# Patient Record
Sex: FEMALE | Race: WHITE | NOT HISPANIC OR LATINO | Employment: FULL TIME | URBAN - METROPOLITAN AREA
[De-identification: names, ages, dates, MRNs, and addresses within clinical notes are randomized per-mention and may not be internally consistent; named-entity substitution may affect disease eponyms.]

---

## 2019-01-18 PROCEDURE — 88175 CYTOPATH C/V AUTO FLUID REDO: CPT | Performed by: OBSTETRICS & GYNECOLOGY

## 2019-01-18 PROCEDURE — 87624 HPV HI-RISK TYP POOLED RSLT: CPT | Performed by: OBSTETRICS & GYNECOLOGY

## 2019-05-14 ENCOUNTER — WALK IN (OUTPATIENT)
Dept: URGENT CARE | Age: 34
End: 2019-05-14

## 2019-05-14 VITALS
DIASTOLIC BLOOD PRESSURE: 80 MMHG | WEIGHT: 150 LBS | HEIGHT: 65 IN | OXYGEN SATURATION: 100 % | HEART RATE: 87 BPM | TEMPERATURE: 98.7 F | RESPIRATION RATE: 18 BRPM | SYSTOLIC BLOOD PRESSURE: 102 MMHG | BODY MASS INDEX: 24.99 KG/M2

## 2019-05-14 DIAGNOSIS — J00 ACUTE NASOPHARYNGITIS: Primary | ICD-10-CM

## 2019-05-14 DIAGNOSIS — J02.9 SORE THROAT: ICD-10-CM

## 2019-05-14 LAB
INTERNAL PROCEDURAL CONTROLS ACCEPTABLE: YES
S PYO AG THROAT QL IA.RAPID: NEGATIVE

## 2019-05-14 PROCEDURE — 99203 OFFICE O/P NEW LOW 30 MIN: CPT | Performed by: NURSE PRACTITIONER

## 2019-05-14 PROCEDURE — 87880 STREP A ASSAY W/OPTIC: CPT | Performed by: NURSE PRACTITIONER

## 2019-05-14 RX ORDER — FEXOFENADINE HCL 180 MG/1
180 TABLET ORAL DAILY
COMMUNITY

## 2019-05-14 RX ORDER — LORAZEPAM 0.5 MG/1
TABLET ORAL
Refills: 2 | COMMUNITY
Start: 2019-04-17 | End: 2023-03-21 | Stop reason: DRUGHIGH

## 2019-05-14 RX ORDER — SERTRALINE HYDROCHLORIDE 25 MG/1
25 TABLET, FILM COATED ORAL DAILY
COMMUNITY
End: 2022-05-02 | Stop reason: ALTCHOICE

## 2019-05-14 SDOH — HEALTH STABILITY: MENTAL HEALTH: HOW OFTEN DO YOU HAVE A DRINK CONTAINING ALCOHOL?: NEVER

## 2019-05-14 ASSESSMENT — ENCOUNTER SYMPTOMS
SWOLLEN GLANDS: 0
TROUBLE SWALLOWING: 0
RESPIRATORY NEGATIVE: 1
HEADACHES: 0
RHINORRHEA: 1
COUGH: 0
SORE THROAT: 1
CHILLS: 0
FATIGUE: 1
WHEEZING: 0
ALLERGIC/IMMUNOLOGIC NEGATIVE: 1
SHORTNESS OF BREATH: 0
FEVER: 0
EYES NEGATIVE: 1

## 2020-04-06 PROBLEM — S52.125D CLOSED NONDISPLACED FRACTURE OF HEAD OF LEFT RADIUS WITH ROUTINE HEALING: Status: ACTIVE | Noted: 2020-04-06

## 2020-09-30 PROBLEM — S52.125D CLOSED NONDISPLACED FRACTURE OF HEAD OF LEFT RADIUS WITH ROUTINE HEALING: Status: RESOLVED | Noted: 2020-04-06 | Resolved: 2020-09-30

## 2020-10-02 ENCOUNTER — EMPLOYEE HEALTH (OUTPATIENT)
Dept: OTHER | Age: 35
End: 2020-10-02

## 2020-10-02 DIAGNOSIS — Z20.822 SUSPECTED COVID-19 VIRUS INFECTION: Primary | ICD-10-CM

## 2020-10-04 ENCOUNTER — EMPLOYEE HEALTH (OUTPATIENT)
Dept: OTHER | Age: 35
End: 2020-10-04

## 2020-10-05 ENCOUNTER — EMPLOYEE HEALTH (OUTPATIENT)
Dept: OTHER | Age: 35
End: 2020-10-05

## 2020-10-06 ENCOUNTER — EMPLOYEE HEALTH (OUTPATIENT)
Dept: OTHER | Age: 35
End: 2020-10-06

## 2020-10-08 ENCOUNTER — EMPLOYEE HEALTH (OUTPATIENT)
Dept: OTHER | Age: 35
End: 2020-10-08

## 2021-03-23 ENCOUNTER — IMMUNIZATION (OUTPATIENT)
Dept: LAB | Age: 36
End: 2021-03-23

## 2021-03-23 DIAGNOSIS — Z23 NEED FOR VACCINATION: Primary | ICD-10-CM

## 2021-03-23 PROCEDURE — 91300 COVID 19 PFIZER-BIONTECH: CPT | Performed by: HOSPITALIST

## 2021-03-23 PROCEDURE — 0001A COVID 19 PFIZER-BIONTECH: CPT | Performed by: HOSPITALIST

## 2021-04-14 ENCOUNTER — IMMUNIZATION (OUTPATIENT)
Dept: LAB | Age: 36
End: 2021-04-14

## 2021-04-14 DIAGNOSIS — Z23 NEED FOR VACCINATION: Primary | ICD-10-CM

## 2021-04-14 PROCEDURE — 0002A COVID 19 PFIZER-BIONTECH: CPT | Performed by: NURSE PRACTITIONER

## 2021-04-14 PROCEDURE — 91300 COVID 19 PFIZER-BIONTECH: CPT | Performed by: NURSE PRACTITIONER

## 2021-07-22 PROBLEM — R73.03 PREDIABETES: Status: ACTIVE | Noted: 2021-07-22

## 2021-07-22 PROBLEM — E06.3 HASHIMOTO'S THYROIDITIS: Status: ACTIVE | Noted: 2021-07-22

## 2021-12-07 ENCOUNTER — LAB REQUISITION (OUTPATIENT)
Dept: LAB | Age: 36
End: 2021-12-07

## 2021-12-07 DIAGNOSIS — Z00.00 ENCOUNTER FOR GENERAL ADULT MEDICAL EXAMINATION WITHOUT ABNORMAL FINDINGS: ICD-10-CM

## 2021-12-07 PROCEDURE — PSEU9049 QUANTIFERON TB PLUS: Performed by: CLINICAL MEDICAL LABORATORY

## 2021-12-07 PROCEDURE — 86480 TB TEST CELL IMMUN MEASURE: CPT | Performed by: CLINICAL MEDICAL LABORATORY

## 2021-12-09 LAB
GAMMA INTERFERON BACKGROUND BLD IA-ACNC: 0.03 IU/ML
M TB IFN-G BLD-IMP: NEGATIVE
M TB IFN-G CD4+ BCKGRND COR BLD-ACNC: 0.02 IU/ML
M TB IFN-G CD4+CD8+ BCKGRND COR BLD-ACNC: 0.03 IU/ML
MITOGEN IGNF BCKGRD COR BLD-ACNC: >10 IU/ML

## 2022-01-16 ENCOUNTER — EMPLOYEE HEALTH (OUTPATIENT)
Dept: OTHER | Age: 37
End: 2022-01-16

## 2022-05-02 ENCOUNTER — WALK IN (OUTPATIENT)
Dept: URGENT CARE | Age: 37
End: 2022-05-02

## 2022-05-02 VITALS
TEMPERATURE: 98.4 F | WEIGHT: 179 LBS | SYSTOLIC BLOOD PRESSURE: 110 MMHG | DIASTOLIC BLOOD PRESSURE: 60 MMHG | HEART RATE: 80 BPM | RESPIRATION RATE: 16 BRPM | HEIGHT: 65 IN | BODY MASS INDEX: 29.82 KG/M2

## 2022-05-02 DIAGNOSIS — H10.32 ACUTE CONJUNCTIVITIS OF LEFT EYE, UNSPECIFIED ACUTE CONJUNCTIVITIS TYPE: Primary | ICD-10-CM

## 2022-05-02 PROCEDURE — 99213 OFFICE O/P EST LOW 20 MIN: CPT | Performed by: NURSE PRACTITIONER

## 2022-05-02 RX ORDER — TOBRAMYCIN 3 MG/ML
1 SOLUTION/ DROPS OPHTHALMIC EVERY 4 HOURS
Qty: 5 ML | Refills: 0 | Status: SHIPPED | OUTPATIENT
Start: 2022-05-02 | End: 2022-05-09

## 2022-05-02 ASSESSMENT — ENCOUNTER SYMPTOMS
RHINORRHEA: 0
ABDOMINAL PAIN: 0
POLYPHAGIA: 0
EYE DISCHARGE: 1
EYE ITCHING: 0
ADENOPATHY: 0
WHEEZING: 0
FOREIGN BODY SENSATION: 0
COLOR CHANGE: 0
CHILLS: 0
DIAPHORESIS: 0
SHORTNESS OF BREATH: 0
WEAKNESS: 0
FEVER: 0
COUGH: 0
FACIAL SWELLING: 0
TROUBLE SWALLOWING: 0
FATIGUE: 0
AGITATION: 0
CHEST TIGHTNESS: 0
BACK PAIN: 0
HEADACHES: 0
LIGHT-HEADEDNESS: 0
SLEEP DISTURBANCE: 0
POLYDIPSIA: 0
VOICE CHANGE: 0
BLURRED VISION: 0
DIARRHEA: 0
NUMBNESS: 0
CONSTIPATION: 0
EYE PAIN: 0
PHOTOPHOBIA: 0
SINUS PRESSURE: 0
CHOKING: 0
WOUND: 0
SINUS PAIN: 0
EYE REDNESS: 1
NAUSEA: 0
VOMITING: 0
DOUBLE VISION: 0
SORE THROAT: 0
APPETITE CHANGE: 0
DIZZINESS: 0

## 2022-06-22 ENCOUNTER — EMPLOYEE HEALTH (OUTPATIENT)
Dept: OTHER | Age: 37
End: 2022-06-22

## 2022-06-23 ENCOUNTER — EMPLOYEE HEALTH (OUTPATIENT)
Dept: OTHER | Age: 37
End: 2022-06-23

## 2022-06-28 ENCOUNTER — EMPLOYEE HEALTH (OUTPATIENT)
Dept: OTHER | Age: 37
End: 2022-06-28

## 2022-06-28 ENCOUNTER — HOSPITAL ENCOUNTER (OUTPATIENT)
Dept: GENERAL RADIOLOGY | Age: 37
Discharge: HOME | End: 2022-06-28
Attending: FAMILY MEDICINE

## 2022-06-28 ENCOUNTER — WALK IN (OUTPATIENT)
Dept: URGENT CARE | Age: 37
End: 2022-06-28
Attending: FAMILY MEDICINE

## 2022-06-28 VITALS
BODY MASS INDEX: 27.46 KG/M2 | DIASTOLIC BLOOD PRESSURE: 90 MMHG | OXYGEN SATURATION: 97 % | RESPIRATION RATE: 14 BRPM | TEMPERATURE: 98.1 F | WEIGHT: 165 LBS | SYSTOLIC BLOOD PRESSURE: 124 MMHG | HEART RATE: 92 BPM

## 2022-06-28 DIAGNOSIS — U07.1 COVID-19 VIRUS INFECTION: ICD-10-CM

## 2022-06-28 DIAGNOSIS — R05.9 COUGH: ICD-10-CM

## 2022-06-28 DIAGNOSIS — R05.9 COUGH: Primary | ICD-10-CM

## 2022-06-28 PROCEDURE — 71046 X-RAY EXAM CHEST 2 VIEWS: CPT

## 2022-06-28 PROCEDURE — 99202 OFFICE O/P NEW SF 15 MIN: CPT

## 2022-06-28 RX ORDER — BENZONATATE 200 MG/1
200 CAPSULE ORAL 3 TIMES DAILY PRN
Qty: 15 CAPSULE | Refills: 0 | Status: SHIPPED | OUTPATIENT
Start: 2022-06-28 | End: 2022-07-03

## 2022-06-28 RX ORDER — ALBUTEROL SULFATE 90 UG/1
2 AEROSOL, METERED RESPIRATORY (INHALATION) EVERY 4 HOURS PRN
Qty: 1 EACH | Refills: 0 | Status: SHIPPED | OUTPATIENT
Start: 2022-06-28

## 2022-06-28 ASSESSMENT — ENCOUNTER SYMPTOMS
NAUSEA: 0
WEAKNESS: 0
EYE REDNESS: 0
ADENOPATHY: 0
SHORTNESS OF BREATH: 0
ABDOMINAL PAIN: 0
HEADACHES: 0
COUGH: 1
VOMITING: 0
SINUS PRESSURE: 0
DIARRHEA: 0
AGITATION: 0
DIZZINESS: 0
FEVER: 0
RHINORRHEA: 1
SORE THROAT: 0

## 2022-06-28 ASSESSMENT — PAIN SCALES - GENERAL: PAINLEVEL: 0

## 2022-12-19 ENCOUNTER — HOSPITAL ENCOUNTER (EMERGENCY)
Age: 37
Discharge: HOME | End: 2022-12-19
Attending: EMERGENCY MEDICINE

## 2022-12-19 VITALS
SYSTOLIC BLOOD PRESSURE: 126 MMHG | DIASTOLIC BLOOD PRESSURE: 78 MMHG | TEMPERATURE: 97.9 F | OXYGEN SATURATION: 99 % | HEART RATE: 88 BPM | RESPIRATION RATE: 16 BRPM

## 2022-12-19 DIAGNOSIS — J02.9 ACUTE VIRAL PHARYNGITIS: Primary | ICD-10-CM

## 2022-12-19 LAB
FLUAV RNA RESP QL NAA+PROBE: NOT DETECTED
FLUBV RNA RESP QL NAA+PROBE: NOT DETECTED
RSV AG NPH QL IA.RAPID: NOT DETECTED
S PYO DNA THROAT QL NAA+PROBE: NOT DETECTED
SARS-COV-2 RNA RESP QL NAA+PROBE: NOT DETECTED
SERVICE CMNT-IMP: NORMAL
SERVICE CMNT-IMP: NORMAL

## 2022-12-19 PROCEDURE — 99282 EMERGENCY DEPT VISIT SF MDM: CPT

## 2022-12-19 PROCEDURE — 10002800 HB RX 250 W HCPCS: Performed by: PHYSICIAN ASSISTANT

## 2022-12-19 PROCEDURE — 96372 THER/PROPH/DIAG INJ SC/IM: CPT

## 2022-12-19 PROCEDURE — C9803 HOPD COVID-19 SPEC COLLECT: HCPCS

## 2022-12-19 PROCEDURE — 0241U COVID/FLU/RSV PANEL: CPT | Performed by: PHYSICIAN ASSISTANT

## 2022-12-19 PROCEDURE — 10004651 HB RX, NO CHARGE ITEM: Performed by: PHYSICIAN ASSISTANT

## 2022-12-19 PROCEDURE — 87651 STREP A DNA AMP PROBE: CPT | Performed by: PHYSICIAN ASSISTANT

## 2022-12-19 RX ORDER — DEXAMETHASONE SODIUM PHOSPHATE 10 MG/ML
10 INJECTION, SOLUTION INTRAMUSCULAR; INTRAVENOUS ONCE
Status: COMPLETED | OUTPATIENT
Start: 2022-12-19 | End: 2022-12-19

## 2022-12-19 RX ORDER — ACETAMINOPHEN 500 MG
1000 TABLET ORAL ONCE
Status: COMPLETED | OUTPATIENT
Start: 2022-12-19 | End: 2022-12-19

## 2022-12-19 RX ADMIN — ACETAMINOPHEN 1000 MG: 500 TABLET ORAL at 17:30

## 2022-12-19 RX ADMIN — DEXAMETHASONE SODIUM PHOSPHATE 10 MG: 10 INJECTION INTRAMUSCULAR; INTRAVENOUS at 17:31

## 2023-03-21 ENCOUNTER — OFFICE VISIT (OUTPATIENT)
Dept: FAMILY MEDICINE | Age: 38
End: 2023-03-21

## 2023-03-21 VITALS
BODY MASS INDEX: 24.44 KG/M2 | SYSTOLIC BLOOD PRESSURE: 121 MMHG | HEART RATE: 76 BPM | WEIGHT: 146.72 LBS | OXYGEN SATURATION: 100 % | RESPIRATION RATE: 16 BRPM | TEMPERATURE: 98.5 F | DIASTOLIC BLOOD PRESSURE: 82 MMHG | HEIGHT: 65 IN

## 2023-03-21 DIAGNOSIS — F41.8 SITUATIONAL ANXIETY: Primary | ICD-10-CM

## 2023-03-21 DIAGNOSIS — K21.9 GASTROESOPHAGEAL REFLUX DISEASE, UNSPECIFIED WHETHER ESOPHAGITIS PRESENT: ICD-10-CM

## 2023-03-21 PROCEDURE — 99215 OFFICE O/P EST HI 40 MIN: CPT | Performed by: FAMILY MEDICINE

## 2023-03-21 RX ORDER — BUPROPION HYDROCHLORIDE 150 MG/1
150 TABLET ORAL DAILY
Qty: 30 TABLET | Refills: 1 | Status: SHIPPED | OUTPATIENT
Start: 2023-03-21 | End: 2023-07-28

## 2023-03-21 ASSESSMENT — ENCOUNTER SYMPTOMS
SHORTNESS OF BREATH: 0
VOMITING: 0
ABDOMINAL PAIN: 0
HEARTBURN: 1
DIARRHEA: 0
NAUSEA: 0
BACK PAIN: 0
HEADACHES: 0
SINUS PAIN: 0
FEVER: 0
BLURRED VISION: 0
CONSTIPATION: 0
BRUISES/BLEEDS EASILY: 0
COUGH: 0
NERVOUS/ANXIOUS: 1

## 2023-03-21 ASSESSMENT — PATIENT HEALTH QUESTIONNAIRE - PHQ9
2. FEELING DOWN, DEPRESSED OR HOPELESS: NOT AT ALL
1. LITTLE INTEREST OR PLEASURE IN DOING THINGS: NOT AT ALL
SUM OF ALL RESPONSES TO PHQ9 QUESTIONS 1 AND 2: 0
CLINICAL INTERPRETATION OF PHQ2 SCORE: NO FURTHER SCREENING NEEDED
SUM OF ALL RESPONSES TO PHQ9 QUESTIONS 1 AND 2: 0

## 2023-03-21 ASSESSMENT — PAIN SCALES - GENERAL: PAINLEVEL: 0

## 2023-07-26 DIAGNOSIS — R10.2 PELVIC PAIN: ICD-10-CM

## 2023-07-26 DIAGNOSIS — Z87.42 HX OF OVARIAN CYST: ICD-10-CM

## 2023-07-26 DIAGNOSIS — R10.32 LLQ PAIN: Primary | ICD-10-CM

## 2023-07-28 ENCOUNTER — HOSPITAL ENCOUNTER (EMERGENCY)
Age: 38
Discharge: HOME | End: 2023-07-28
Attending: EMERGENCY MEDICINE

## 2023-07-28 ENCOUNTER — APPOINTMENT (OUTPATIENT)
Dept: ULTRASOUND IMAGING | Age: 38
End: 2023-07-28
Attending: PHYSICIAN ASSISTANT

## 2023-07-28 VITALS
WEIGHT: 151.68 LBS | BODY MASS INDEX: 25.27 KG/M2 | TEMPERATURE: 98.4 F | HEIGHT: 65 IN | RESPIRATION RATE: 18 BRPM | OXYGEN SATURATION: 99 % | DIASTOLIC BLOOD PRESSURE: 87 MMHG | SYSTOLIC BLOOD PRESSURE: 127 MMHG | HEART RATE: 86 BPM

## 2023-07-28 DIAGNOSIS — N30.00 ACUTE CYSTITIS WITHOUT HEMATURIA: Primary | ICD-10-CM

## 2023-07-28 DIAGNOSIS — R10.2 PELVIC PAIN IN FEMALE: ICD-10-CM

## 2023-07-28 DIAGNOSIS — N80.129 ENDOMETRIOMA OF OVARY: ICD-10-CM

## 2023-07-28 LAB
ALBUMIN SERPL-MCNC: 3.6 G/DL (ref 3.6–5.1)
ALBUMIN/GLOB SERPL: 0.9 {RATIO} (ref 1–2.4)
ALP SERPL-CCNC: 64 UNITS/L (ref 45–117)
ALT SERPL-CCNC: 14 UNITS/L
ANION GAP SERPL CALC-SCNC: 9 MMOL/L (ref 7–19)
APPEARANCE UR: CLEAR
AST SERPL-CCNC: 11 UNITS/L
BACTERIA #/AREA URNS HPF: ABNORMAL /HPF
BASOPHILS # BLD: 0 K/MCL (ref 0–0.3)
BASOPHILS NFR BLD: 0 %
BILIRUB SERPL-MCNC: 0.7 MG/DL (ref 0.2–1)
BILIRUB UR QL STRIP: NEGATIVE
BUN SERPL-MCNC: 12 MG/DL (ref 6–20)
BUN/CREAT SERPL: 18 (ref 7–25)
CALCIUM SERPL-MCNC: 9.1 MG/DL (ref 8.4–10.2)
CHLORIDE SERPL-SCNC: 106 MMOL/L (ref 97–110)
CO2 SERPL-SCNC: 27 MMOL/L (ref 21–32)
COLOR UR: ABNORMAL
CREAT SERPL-MCNC: 0.66 MG/DL (ref 0.51–0.95)
DEPRECATED RDW RBC: 41.3 FL (ref 39–50)
EOSINOPHIL # BLD: 0.1 K/MCL (ref 0–0.5)
EOSINOPHIL NFR BLD: 1 %
ERYTHROCYTE [DISTWIDTH] IN BLOOD: 12.5 % (ref 11–15)
FASTING DURATION TIME PATIENT: ABNORMAL H
GFR SERPLBLD BASED ON 1.73 SQ M-ARVRAT: >90 ML/MIN
GLOBULIN SER-MCNC: 4 G/DL (ref 2–4)
GLUCOSE SERPL-MCNC: 100 MG/DL (ref 70–99)
GLUCOSE UR STRIP-MCNC: NEGATIVE MG/DL
HCG UR QL: NEGATIVE
HCT VFR BLD CALC: 39 % (ref 36–46.5)
HGB BLD-MCNC: 12.3 G/DL (ref 12–15.5)
HGB UR QL STRIP: ABNORMAL
HYALINE CASTS #/AREA URNS LPF: ABNORMAL /LPF
IMM GRANULOCYTES # BLD AUTO: 0 K/MCL (ref 0–0.2)
IMM GRANULOCYTES # BLD: 0 %
KETONES UR STRIP-MCNC: NEGATIVE MG/DL
LEUKOCYTE ESTERASE UR QL STRIP: ABNORMAL
LIPASE SERPL-CCNC: 128 UNITS/L (ref 73–393)
LYMPHOCYTES # BLD: 2 K/MCL (ref 1–4.8)
LYMPHOCYTES NFR BLD: 21 %
MCH RBC QN AUTO: 28.6 PG (ref 26–34)
MCHC RBC AUTO-ENTMCNC: 31.5 G/DL (ref 32–36.5)
MCV RBC AUTO: 90.7 FL (ref 78–100)
MONOCYTES # BLD: 0.5 K/MCL (ref 0.3–0.9)
MONOCYTES NFR BLD: 6 %
NEUTROPHILS # BLD: 6.7 K/MCL (ref 1.8–7.7)
NEUTROPHILS NFR BLD: 72 %
NITRITE UR QL STRIP: NEGATIVE
NRBC BLD MANUAL-RTO: 0 /100 WBC
PH UR STRIP: 5.5 [PH] (ref 5–7)
PLATELET # BLD AUTO: 329 K/MCL (ref 140–450)
POTASSIUM SERPL-SCNC: 3.7 MMOL/L (ref 3.4–5.1)
PROT SERPL-MCNC: 7.6 G/DL (ref 6.4–8.2)
PROT UR STRIP-MCNC: NEGATIVE MG/DL
RBC # BLD: 4.3 MIL/MCL (ref 4–5.2)
RBC #/AREA URNS HPF: ABNORMAL /HPF
SODIUM SERPL-SCNC: 138 MMOL/L (ref 135–145)
SP GR UR STRIP: 1.01 (ref 1–1.03)
SQUAMOUS #/AREA URNS HPF: ABNORMAL /HPF
UROBILINOGEN UR STRIP-MCNC: 0.2 MG/DL
WBC # BLD: 9.4 K/MCL (ref 4.2–11)
WBC #/AREA URNS HPF: ABNORMAL /HPF

## 2023-07-28 PROCEDURE — 76856 US EXAM PELVIC COMPLETE: CPT

## 2023-07-28 PROCEDURE — 81001 URINALYSIS AUTO W/SCOPE: CPT | Performed by: EMERGENCY MEDICINE

## 2023-07-28 PROCEDURE — 96361 HYDRATE IV INFUSION ADD-ON: CPT

## 2023-07-28 PROCEDURE — 10004651 HB RX, NO CHARGE ITEM: Performed by: PHYSICIAN ASSISTANT

## 2023-07-28 PROCEDURE — 80053 COMPREHEN METABOLIC PANEL: CPT | Performed by: EMERGENCY MEDICINE

## 2023-07-28 PROCEDURE — 10002807 HB RX 258: Performed by: PHYSICIAN ASSISTANT

## 2023-07-28 PROCEDURE — 85025 COMPLETE CBC W/AUTO DIFF WBC: CPT | Performed by: EMERGENCY MEDICINE

## 2023-07-28 PROCEDURE — 99284 EMERGENCY DEPT VISIT MOD MDM: CPT

## 2023-07-28 PROCEDURE — 96374 THER/PROPH/DIAG INJ IV PUSH: CPT

## 2023-07-28 PROCEDURE — 84703 CHORIONIC GONADOTROPIN ASSAY: CPT

## 2023-07-28 PROCEDURE — 87086 URINE CULTURE/COLONY COUNT: CPT | Performed by: EMERGENCY MEDICINE

## 2023-07-28 PROCEDURE — 83690 ASSAY OF LIPASE: CPT | Performed by: EMERGENCY MEDICINE

## 2023-07-28 PROCEDURE — 10002800 HB RX 250 W HCPCS: Performed by: PHYSICIAN ASSISTANT

## 2023-07-28 PROCEDURE — 76830 TRANSVAGINAL US NON-OB: CPT

## 2023-07-28 RX ORDER — KETOROLAC TROMETHAMINE 15 MG/ML
15 INJECTION, SOLUTION INTRAMUSCULAR; INTRAVENOUS ONCE
Status: COMPLETED | OUTPATIENT
Start: 2023-07-28 | End: 2023-07-28

## 2023-07-28 RX ORDER — AMOXICILLIN AND CLAVULANATE POTASSIUM 875; 125 MG/1; MG/1
1 TABLET, FILM COATED ORAL EVERY 12 HOURS
Qty: 20 TABLET | Refills: 0 | Status: SHIPPED | OUTPATIENT
Start: 2023-07-28 | End: 2023-08-07

## 2023-07-28 RX ORDER — ACETAMINOPHEN 500 MG
1000 TABLET ORAL ONCE
Status: COMPLETED | OUTPATIENT
Start: 2023-07-28 | End: 2023-07-28

## 2023-07-28 RX ADMIN — ACETAMINOPHEN 1000 MG: 500 TABLET ORAL at 16:53

## 2023-07-28 RX ADMIN — KETOROLAC TROMETHAMINE 15 MG: 15 INJECTION, SOLUTION INTRAMUSCULAR; INTRAVENOUS at 16:54

## 2023-07-28 RX ADMIN — SODIUM CHLORIDE 1000 ML: 9 INJECTION, SOLUTION INTRAVENOUS at 16:43

## 2023-07-28 ASSESSMENT — PAIN SCALES - GENERAL
PAINLEVEL_OUTOF10: 3
PAINLEVEL_OUTOF10: 2
PAINLEVEL_OUTOF10: 3

## 2023-07-30 LAB — BACTERIA UR CULT: NORMAL

## 2023-07-31 ENCOUNTER — TELEPHONE (OUTPATIENT)
Dept: OBGYN | Age: 38
End: 2023-07-31

## 2023-08-03 DIAGNOSIS — N94.89 ADNEXAL MASS: Primary | ICD-10-CM

## 2023-08-03 DIAGNOSIS — R10.2 PELVIC PAIN IN FEMALE: ICD-10-CM

## 2023-08-04 ENCOUNTER — HOSPITAL ENCOUNTER (OUTPATIENT)
Dept: MRI IMAGING | Age: 38
Discharge: HOME | End: 2023-08-04
Attending: PHYSICIAN ASSISTANT

## 2023-08-04 DIAGNOSIS — N94.89 ADNEXAL MASS: Primary | ICD-10-CM

## 2023-08-04 DIAGNOSIS — R10.2 PELVIC PAIN IN FEMALE: ICD-10-CM

## 2023-08-04 DIAGNOSIS — N94.89 ADNEXAL MASS: ICD-10-CM

## 2023-08-04 PROCEDURE — 72195 MRI PELVIS W/O DYE: CPT

## 2023-08-04 PROCEDURE — G1004 CDSM NDSC: HCPCS

## 2023-08-07 ENCOUNTER — LAB SERVICES (OUTPATIENT)
Dept: LAB | Age: 38
End: 2023-08-07

## 2023-08-07 DIAGNOSIS — N94.89 ADNEXAL MASS: Primary | ICD-10-CM

## 2023-08-07 DIAGNOSIS — R10.2 PELVIC PAIN: ICD-10-CM

## 2023-08-07 LAB
BUN SERPL-MCNC: 14 MG/DL (ref 6–20)
BUN/CREAT SERPL: 20 (ref 7–25)
CANCER AG125 SERPL-ACNC: 85 UNITS/ML (ref 0–35)
CEA SERPL-MCNC: <2 NG/ML (ref 0–5)
CREAT SERPL-MCNC: 0.71 MG/DL (ref 0.51–0.95)
GFR SERPLBLD BASED ON 1.73 SQ M-ARVRAT: >90 ML/MIN

## 2023-08-07 PROCEDURE — 82565 ASSAY OF CREATININE: CPT | Performed by: CLINICAL MEDICAL LABORATORY

## 2023-08-07 PROCEDURE — 84520 ASSAY OF UREA NITROGEN: CPT | Performed by: CLINICAL MEDICAL LABORATORY

## 2023-08-07 PROCEDURE — 36415 COLL VENOUS BLD VENIPUNCTURE: CPT | Performed by: CLINICAL MEDICAL LABORATORY

## 2023-08-07 PROCEDURE — 86301 IMMUNOASSAY TUMOR CA 19-9: CPT | Performed by: CLINICAL MEDICAL LABORATORY

## 2023-08-07 PROCEDURE — 86304 IMMUNOASSAY TUMOR CA 125: CPT | Performed by: CLINICAL MEDICAL LABORATORY

## 2023-08-07 PROCEDURE — 82378 CARCINOEMBRYONIC ANTIGEN: CPT | Performed by: CLINICAL MEDICAL LABORATORY

## 2023-08-08 LAB — CANCER AG19-9 SERPL-ACNC: 45 UNITS/ML (ref 0–35)

## 2023-08-09 PROCEDURE — 88305 TISSUE EXAM BY PATHOLOGIST: CPT | Performed by: CLINICAL MEDICAL LABORATORY

## 2023-08-10 ENCOUNTER — LAB REQUISITION (OUTPATIENT)
Dept: LAB | Age: 38
End: 2023-08-10

## 2023-08-10 DIAGNOSIS — R19.09 OTHER INTRA-ABDOMINAL AND PELVIC SWELLING, MASS AND LUMP: ICD-10-CM

## 2023-08-10 DIAGNOSIS — R10.2 PELVIC AND PERINEAL PAIN: ICD-10-CM

## 2023-08-11 ENCOUNTER — LAB SERVICES (OUTPATIENT)
Dept: LAB | Age: 38
End: 2023-08-11

## 2023-08-11 DIAGNOSIS — N94.89 ADNEXAL MASS: Primary | ICD-10-CM

## 2023-08-11 DIAGNOSIS — R10.2 PELVIC PAIN IN FEMALE: ICD-10-CM

## 2023-08-11 DIAGNOSIS — Z01.812 PRE-PROCEDURAL LABORATORY EXAMINATION: ICD-10-CM

## 2023-08-11 LAB
ALBUMIN SERPL-MCNC: 3.9 G/DL (ref 3.6–5.1)
ALBUMIN/GLOB SERPL: 1.1 {RATIO} (ref 1–2.4)
ALP SERPL-CCNC: 66 UNITS/L (ref 45–117)
ALT SERPL-CCNC: 13 UNITS/L
ANION GAP SERPL CALC-SCNC: 11 MMOL/L (ref 7–19)
APTT PPP: 26 SEC (ref 22–30)
ASR DISCLAIMER: NORMAL
AST SERPL-CCNC: 8 UNITS/L
BASOPHILS # BLD: 0 K/MCL (ref 0–0.3)
BASOPHILS NFR BLD: 0 %
BILIRUB SERPL-MCNC: 0.6 MG/DL (ref 0.2–1)
BUN SERPL-MCNC: 13 MG/DL (ref 6–20)
BUN/CREAT SERPL: 16 (ref 7–25)
CALCIUM SERPL-MCNC: 9.3 MG/DL (ref 8.4–10.2)
CASE RPRT: NORMAL
CHLORIDE SERPL-SCNC: 104 MMOL/L (ref 97–110)
CLINICAL INFO: NORMAL
CO2 SERPL-SCNC: 28 MMOL/L (ref 21–32)
CREAT SERPL-MCNC: 0.79 MG/DL (ref 0.51–0.95)
DEPRECATED RDW RBC: 42.1 FL (ref 39–50)
EOSINOPHIL # BLD: 0.1 K/MCL (ref 0–0.5)
EOSINOPHIL NFR BLD: 1 %
ERYTHROCYTE [DISTWIDTH] IN BLOOD: 12.6 % (ref 11–15)
FASTING DURATION TIME PATIENT: 0 HOURS (ref 0–999)
GFR SERPLBLD BASED ON 1.73 SQ M-ARVRAT: >90 ML/MIN
GLOBULIN SER-MCNC: 3.6 G/DL (ref 2–4)
GLUCOSE SERPL-MCNC: 83 MG/DL (ref 70–99)
HCT VFR BLD CALC: 42.5 % (ref 36–46.5)
HGB BLD-MCNC: 13.2 G/DL (ref 12–15.5)
IMM GRANULOCYTES # BLD AUTO: 0 K/MCL (ref 0–0.2)
IMM GRANULOCYTES # BLD: 0 %
INR PPP: 1.1
LYMPHOCYTES # BLD: 2.2 K/MCL (ref 1–4.8)
LYMPHOCYTES NFR BLD: 25 %
MCH RBC QN AUTO: 28.2 PG (ref 26–34)
MCHC RBC AUTO-ENTMCNC: 31.1 G/DL (ref 32–36.5)
MCV RBC AUTO: 90.8 FL (ref 78–100)
MONOCYTES # BLD: 0.5 K/MCL (ref 0.3–0.9)
MONOCYTES NFR BLD: 6 %
NEUTROPHILS # BLD: 5.8 K/MCL (ref 1.8–7.7)
NEUTROPHILS NFR BLD: 68 %
NRBC BLD MANUAL-RTO: 0 /100 WBC
PATH REPORT.FINAL DX SPEC: NORMAL
PATH REPORT.GROSS SPEC: NORMAL
PLATELET # BLD AUTO: 383 K/MCL (ref 140–450)
POTASSIUM SERPL-SCNC: 4.4 MMOL/L (ref 3.4–5.1)
PROT SERPL-MCNC: 7.5 G/DL (ref 6.4–8.2)
PROTHROMBIN TIME: 10.7 SEC (ref 9.7–11.8)
RBC # BLD: 4.68 MIL/MCL (ref 4–5.2)
SODIUM SERPL-SCNC: 139 MMOL/L (ref 135–145)
WBC # BLD: 8.6 K/MCL (ref 4.2–11)

## 2023-08-11 PROCEDURE — 85730 THROMBOPLASTIN TIME PARTIAL: CPT | Performed by: CLINICAL MEDICAL LABORATORY

## 2023-08-11 PROCEDURE — 36415 COLL VENOUS BLD VENIPUNCTURE: CPT | Performed by: CLINICAL MEDICAL LABORATORY

## 2023-08-11 PROCEDURE — 80053 COMPREHEN METABOLIC PANEL: CPT | Performed by: CLINICAL MEDICAL LABORATORY

## 2023-08-11 PROCEDURE — 85025 COMPLETE CBC W/AUTO DIFF WBC: CPT | Performed by: CLINICAL MEDICAL LABORATORY

## 2023-08-11 PROCEDURE — 85610 PROTHROMBIN TIME: CPT | Performed by: CLINICAL MEDICAL LABORATORY

## 2023-08-16 ENCOUNTER — APPOINTMENT (OUTPATIENT)
Dept: OBGYN | Age: 38
End: 2023-08-16

## 2023-08-16 ENCOUNTER — LAB SERVICES (OUTPATIENT)
Dept: LAB | Age: 38
End: 2023-08-16

## 2023-08-16 DIAGNOSIS — R10.2 FEMALE PELVIC PAIN: Primary | ICD-10-CM

## 2023-08-16 DIAGNOSIS — E27.8 ADRENAL MASS (CMD): ICD-10-CM

## 2023-08-16 LAB
ABO + RH BLD: NORMAL
BLD GP AB SCN SERPL QL GEL: NEGATIVE
TYPE AND SCREEN EXPIRATION DATE: NORMAL

## 2023-08-16 PROCEDURE — 36415 COLL VENOUS BLD VENIPUNCTURE: CPT | Performed by: CLINICAL MEDICAL LABORATORY

## 2023-08-16 PROCEDURE — 86850 RBC ANTIBODY SCREEN: CPT | Performed by: CLINICAL MEDICAL LABORATORY

## 2023-08-16 PROCEDURE — 86901 BLOOD TYPING SEROLOGIC RH(D): CPT | Performed by: CLINICAL MEDICAL LABORATORY

## 2023-08-16 PROCEDURE — 86900 BLOOD TYPING SEROLOGIC ABO: CPT | Performed by: CLINICAL MEDICAL LABORATORY

## 2023-08-17 ENCOUNTER — ANESTHESIA EVENT (OUTPATIENT)
Dept: SURGERY | Age: 38
End: 2023-08-17

## 2023-08-17 ENCOUNTER — ANESTHESIA (OUTPATIENT)
Dept: SURGERY | Age: 38
End: 2023-08-17

## 2023-08-17 ENCOUNTER — HOSPITAL ENCOUNTER (OUTPATIENT)
Age: 38
Setting detail: OBSERVATION
Discharge: HOME | End: 2023-08-18
Attending: OBSTETRICS & GYNECOLOGY | Admitting: OBSTETRICS & GYNECOLOGY

## 2023-08-17 DIAGNOSIS — G89.18 POSTOPERATIVE PAIN: ICD-10-CM

## 2023-08-17 DIAGNOSIS — G89.18 POST-OP PAIN: Primary | ICD-10-CM

## 2023-08-17 DIAGNOSIS — N99.89 POSTOPERATIVE URINARY RETENTION: ICD-10-CM

## 2023-08-17 DIAGNOSIS — R33.8 POSTOPERATIVE URINARY RETENTION: ICD-10-CM

## 2023-08-17 LAB
B-HCG UR QL: NEGATIVE
INTERNAL PROCEDURAL CONTROLS ACCEPTABLE: YES
TEST LOT EXPIRATION DATE: NORMAL
TEST LOT NUMBER: NORMAL

## 2023-08-17 PROCEDURE — 13000098 HB GENERAL ROBOTIC CASE S/U + 1ST 15 MIN: Performed by: OBSTETRICS & GYNECOLOGY

## 2023-08-17 PROCEDURE — 10002800 HB RX 250 W HCPCS: Performed by: OBSTETRICS & GYNECOLOGY

## 2023-08-17 PROCEDURE — 10006027 HB SUPPLY 278: Performed by: OBSTETRICS & GYNECOLOGY

## 2023-08-17 PROCEDURE — 10002800 HB RX 250 W HCPCS

## 2023-08-17 PROCEDURE — 10004452 HB PACU ADDL 30 MINUTES: Performed by: OBSTETRICS & GYNECOLOGY

## 2023-08-17 PROCEDURE — 10006023 HB SUPPLY 272: Performed by: OBSTETRICS & GYNECOLOGY

## 2023-08-17 PROCEDURE — 10004451 HB PACU RECOVERY 1ST 30 MINUTES: Performed by: OBSTETRICS & GYNECOLOGY

## 2023-08-17 PROCEDURE — 96376 TX/PRO/DX INJ SAME DRUG ADON: CPT

## 2023-08-17 PROCEDURE — 10002801 HB RX 250 W/O HCPCS

## 2023-08-17 PROCEDURE — 10002807 HB RX 258

## 2023-08-17 PROCEDURE — 88112 CYTOPATH CELL ENHANCE TECH: CPT | Performed by: OBSTETRICS & GYNECOLOGY

## 2023-08-17 PROCEDURE — 81025 URINE PREGNANCY TEST: CPT

## 2023-08-17 PROCEDURE — 10002803 HB RX 637

## 2023-08-17 PROCEDURE — 88305 TISSUE EXAM BY PATHOLOGIST: CPT | Performed by: OBSTETRICS & GYNECOLOGY

## 2023-08-17 PROCEDURE — 13000001 HB PHASE II RECOVERY EA 30 MINUTES: Performed by: OBSTETRICS & GYNECOLOGY

## 2023-08-17 PROCEDURE — 10002803 HB RX 637: Performed by: OBSTETRICS & GYNECOLOGY

## 2023-08-17 PROCEDURE — 10002801 HB RX 250 W/O HCPCS: Performed by: OBSTETRICS & GYNECOLOGY

## 2023-08-17 PROCEDURE — G0378 HOSPITAL OBSERVATION PER HR: HCPCS

## 2023-08-17 PROCEDURE — 10004651 HB RX, NO CHARGE ITEM: Performed by: OBSTETRICS & GYNECOLOGY

## 2023-08-17 PROCEDURE — 96374 THER/PROPH/DIAG INJ IV PUSH: CPT

## 2023-08-17 PROCEDURE — 13000002 HB ANESTHESIA  GENERAL  S/U + 1ST 15 MIN: Performed by: OBSTETRICS & GYNECOLOGY

## 2023-08-17 PROCEDURE — 13000099 HB GENERAL ROBOTIC CASE EA ADD MINUTE: Performed by: OBSTETRICS & GYNECOLOGY

## 2023-08-17 PROCEDURE — 13000003 HB ANESTHESIA  GENERAL EA ADD MINUTE: Performed by: OBSTETRICS & GYNECOLOGY

## 2023-08-17 DEVICE — SET TISS CLSR DUPLOJECT TISSEEL PREFL SYRINGE 10ML: Type: IMPLANTABLE DEVICE | Site: ABDOMEN | Status: FUNCTIONAL

## 2023-08-17 RX ORDER — HYDROCODONE BITARTRATE AND ACETAMINOPHEN 5; 325 MG/1; MG/1
1 TABLET ORAL EVERY 4 HOURS PRN
Status: DISCONTINUED | OUTPATIENT
Start: 2023-08-17 | End: 2023-08-18 | Stop reason: HOSPADM

## 2023-08-17 RX ORDER — NALOXONE HCL 0.4 MG/ML
0.2 VIAL (ML) INJECTION EVERY 5 MIN PRN
Status: DISCONTINUED | OUTPATIENT
Start: 2023-08-17 | End: 2023-08-17 | Stop reason: HOSPADM

## 2023-08-17 RX ORDER — GLYCOPYRROLATE 0.2 MG/ML
INJECTION, SOLUTION INTRAMUSCULAR; INTRAVENOUS PRN
Status: DISCONTINUED | OUTPATIENT
Start: 2023-08-17 | End: 2023-08-17

## 2023-08-17 RX ORDER — PROMETHAZINE HYDROCHLORIDE 25 MG/1
25 TABLET ORAL EVERY 6 HOURS PRN
Status: DISCONTINUED | OUTPATIENT
Start: 2023-08-17 | End: 2023-08-17 | Stop reason: HOSPADM

## 2023-08-17 RX ORDER — PROCHLORPERAZINE EDISYLATE 5 MG/ML
5 INJECTION INTRAMUSCULAR; INTRAVENOUS EVERY 4 HOURS PRN
Status: DISCONTINUED | OUTPATIENT
Start: 2023-08-17 | End: 2023-08-18 | Stop reason: HOSPADM

## 2023-08-17 RX ORDER — LIDOCAINE HYDROCHLORIDE 20 MG/ML
INJECTION, SOLUTION INFILTRATION; PERINEURAL PRN
Status: DISCONTINUED | OUTPATIENT
Start: 2023-08-17 | End: 2023-08-17

## 2023-08-17 RX ORDER — SODIUM CHLORIDE, SODIUM LACTATE, POTASSIUM CHLORIDE, CALCIUM CHLORIDE 600; 310; 30; 20 MG/100ML; MG/100ML; MG/100ML; MG/100ML
INJECTION, SOLUTION INTRAVENOUS CONTINUOUS
Status: DISCONTINUED | OUTPATIENT
Start: 2023-08-17 | End: 2023-08-17 | Stop reason: HOSPADM

## 2023-08-17 RX ORDER — PROPOFOL 10 MG/ML
INJECTION, EMULSION INTRAVENOUS PRN
Status: DISCONTINUED | OUTPATIENT
Start: 2023-08-17 | End: 2023-08-17

## 2023-08-17 RX ORDER — GABAPENTIN 100 MG/1
100 CAPSULE ORAL 3 TIMES DAILY
Status: DISCONTINUED | OUTPATIENT
Start: 2023-08-17 | End: 2023-08-18 | Stop reason: HOSPADM

## 2023-08-17 RX ORDER — DIPHENHYDRAMINE HYDROCHLORIDE 50 MG/ML
25 INJECTION INTRAMUSCULAR; INTRAVENOUS EVERY 4 HOURS PRN
Status: DISCONTINUED | OUTPATIENT
Start: 2023-08-17 | End: 2023-08-17 | Stop reason: HOSPADM

## 2023-08-17 RX ORDER — ONDANSETRON 4 MG/1
4 TABLET, ORALLY DISINTEGRATING ORAL EVERY 12 HOURS PRN
Status: DISCONTINUED | OUTPATIENT
Start: 2023-08-17 | End: 2023-08-18 | Stop reason: HOSPADM

## 2023-08-17 RX ORDER — DOCUSATE SODIUM 100 MG/1
100 CAPSULE, LIQUID FILLED ORAL 2 TIMES DAILY
Qty: 30 CAPSULE | Refills: 0 | Status: SHIPPED | OUTPATIENT
Start: 2023-08-17

## 2023-08-17 RX ORDER — BUPIVACAINE HYDROCHLORIDE 2.5 MG/ML
INJECTION, SOLUTION EPIDURAL; INFILTRATION; INTRACAUDAL PRN
Status: DISCONTINUED | OUTPATIENT
Start: 2023-08-17 | End: 2023-08-17 | Stop reason: HOSPADM

## 2023-08-17 RX ORDER — PROCHLORPERAZINE MALEATE 5 MG/1
5 TABLET ORAL EVERY 4 HOURS PRN
Status: DISCONTINUED | OUTPATIENT
Start: 2023-08-17 | End: 2023-08-18 | Stop reason: HOSPADM

## 2023-08-17 RX ORDER — HYDRALAZINE HYDROCHLORIDE 20 MG/ML
5 INJECTION INTRAMUSCULAR; INTRAVENOUS EVERY 10 MIN PRN
Status: DISCONTINUED | OUTPATIENT
Start: 2023-08-17 | End: 2023-08-17 | Stop reason: HOSPADM

## 2023-08-17 RX ORDER — DROPERIDOL 2.5 MG/ML
INJECTION, SOLUTION INTRAMUSCULAR; INTRAVENOUS
Status: DISPENSED
Start: 2023-08-17 | End: 2023-08-18

## 2023-08-17 RX ORDER — ROCURONIUM BROMIDE 10 MG/ML
INJECTION, SOLUTION INTRAVENOUS PRN
Status: DISCONTINUED | OUTPATIENT
Start: 2023-08-17 | End: 2023-08-17

## 2023-08-17 RX ORDER — HYDROCODONE BITARTRATE AND ACETAMINOPHEN 5; 325 MG/1; MG/1
TABLET ORAL EVERY 6 HOURS PRN
Status: ON HOLD | COMMUNITY
End: 2023-08-17 | Stop reason: HOSPADM

## 2023-08-17 RX ORDER — ACETAMINOPHEN 325 MG/1
650 TABLET ORAL ONCE
Status: COMPLETED | OUTPATIENT
Start: 2023-08-17 | End: 2023-08-17

## 2023-08-17 RX ORDER — MIDAZOLAM HYDROCHLORIDE 1 MG/ML
INJECTION, SOLUTION INTRAMUSCULAR; INTRAVENOUS PRN
Status: DISCONTINUED | OUTPATIENT
Start: 2023-08-17 | End: 2023-08-17

## 2023-08-17 RX ORDER — NEOSTIGMINE METHYLSULFATE 4 MG/4 ML
SYRINGE (ML) INTRAVENOUS PRN
Status: DISCONTINUED | OUTPATIENT
Start: 2023-08-17 | End: 2023-08-17

## 2023-08-17 RX ORDER — ONDANSETRON 2 MG/ML
4 INJECTION INTRAMUSCULAR; INTRAVENOUS 2 TIMES DAILY PRN
Status: DISCONTINUED | OUTPATIENT
Start: 2023-08-17 | End: 2023-08-17 | Stop reason: HOSPADM

## 2023-08-17 RX ORDER — SODIUM CHLORIDE, SODIUM LACTATE, POTASSIUM CHLORIDE, CALCIUM CHLORIDE 600; 310; 30; 20 MG/100ML; MG/100ML; MG/100ML; MG/100ML
INJECTION, SOLUTION INTRAVENOUS CONTINUOUS
Status: DISCONTINUED | OUTPATIENT
Start: 2023-08-17 | End: 2023-08-18 | Stop reason: HOSPADM

## 2023-08-17 RX ORDER — GABAPENTIN 300 MG/1
300 CAPSULE ORAL ONCE
Status: COMPLETED | OUTPATIENT
Start: 2023-08-17 | End: 2023-08-17

## 2023-08-17 RX ORDER — DEXAMETHASONE SODIUM PHOSPHATE 4 MG/ML
INJECTION, SOLUTION INTRA-ARTICULAR; INTRALESIONAL; INTRAMUSCULAR; INTRAVENOUS; SOFT TISSUE PRN
Status: DISCONTINUED | OUTPATIENT
Start: 2023-08-17 | End: 2023-08-17

## 2023-08-17 RX ORDER — SODIUM CHLORIDE, SODIUM LACTATE, POTASSIUM CHLORIDE, CALCIUM CHLORIDE 600; 310; 30; 20 MG/100ML; MG/100ML; MG/100ML; MG/100ML
INJECTION, SOLUTION INTRAVENOUS CONTINUOUS PRN
Status: DISCONTINUED | OUTPATIENT
Start: 2023-08-17 | End: 2023-08-17

## 2023-08-17 RX ORDER — HUMAN INSULIN 100 [IU]/ML
INJECTION, SOLUTION SUBCUTANEOUS
Status: DISCONTINUED | OUTPATIENT
Start: 2023-08-17 | End: 2023-08-17 | Stop reason: HOSPADM

## 2023-08-17 RX ORDER — LIDOCAINE HYDROCHLORIDE 10 MG/ML
5-10 INJECTION, SOLUTION EPIDURAL; INFILTRATION; INTRACAUDAL; PERINEURAL PRN
Status: DISCONTINUED | OUTPATIENT
Start: 2023-08-17 | End: 2023-08-17 | Stop reason: HOSPADM

## 2023-08-17 RX ORDER — DROPERIDOL 2.5 MG/ML
0.62 INJECTION, SOLUTION INTRAMUSCULAR; INTRAVENOUS
Status: COMPLETED | OUTPATIENT
Start: 2023-08-17 | End: 2023-08-17

## 2023-08-17 RX ORDER — SCOLOPAMINE TRANSDERMAL SYSTEM 1 MG/1
1 PATCH, EXTENDED RELEASE TRANSDERMAL
Status: DISCONTINUED | OUTPATIENT
Start: 2023-08-17 | End: 2023-08-17 | Stop reason: HOSPADM

## 2023-08-17 RX ORDER — 0.9 % SODIUM CHLORIDE 0.9 %
2 VIAL (ML) INJECTION EVERY 12 HOURS SCHEDULED
Status: DISCONTINUED | OUTPATIENT
Start: 2023-08-17 | End: 2023-08-17 | Stop reason: HOSPADM

## 2023-08-17 RX ORDER — SCOLOPAMINE TRANSDERMAL SYSTEM 1 MG/1
PATCH, EXTENDED RELEASE TRANSDERMAL
Status: DISCONTINUED
Start: 2023-08-17 | End: 2023-08-18 | Stop reason: HOSPADM

## 2023-08-17 RX ORDER — DEXTROSE MONOHYDRATE 25 G/50ML
25 INJECTION, SOLUTION INTRAVENOUS PRN
Status: DISCONTINUED | OUTPATIENT
Start: 2023-08-17 | End: 2023-08-17 | Stop reason: HOSPADM

## 2023-08-17 RX ORDER — PROCHLORPERAZINE EDISYLATE 5 MG/ML
5 INJECTION INTRAMUSCULAR; INTRAVENOUS EVERY 4 HOURS PRN
Status: DISCONTINUED | OUTPATIENT
Start: 2023-08-17 | End: 2023-08-17 | Stop reason: HOSPADM

## 2023-08-17 RX ORDER — ONDANSETRON 2 MG/ML
INJECTION INTRAMUSCULAR; INTRAVENOUS PRN
Status: DISCONTINUED | OUTPATIENT
Start: 2023-08-17 | End: 2023-08-17

## 2023-08-17 RX ORDER — IBUPROFEN 600 MG/1
600 TABLET ORAL EVERY 6 HOURS PRN
Qty: 30 TABLET | Refills: 0 | Status: SHIPPED | OUTPATIENT
Start: 2023-08-17

## 2023-08-17 RX ORDER — ONDANSETRON 2 MG/ML
4 INJECTION INTRAMUSCULAR; INTRAVENOUS EVERY 12 HOURS PRN
Status: DISCONTINUED | OUTPATIENT
Start: 2023-08-17 | End: 2023-08-18 | Stop reason: HOSPADM

## 2023-08-17 RX ORDER — FAMOTIDINE 20 MG/1
20 TABLET, FILM COATED ORAL
Status: COMPLETED | OUTPATIENT
Start: 2023-08-17 | End: 2023-08-17

## 2023-08-17 RX ORDER — HYDROCODONE BITARTRATE AND ACETAMINOPHEN 5; 325 MG/1; MG/1
1 TABLET ORAL EVERY 6 HOURS PRN
Qty: 20 TABLET | Refills: 0 | Status: SHIPPED | OUTPATIENT
Start: 2023-08-17

## 2023-08-17 RX ORDER — ACETAMINOPHEN AND CODEINE PHOSPHATE 300; 60 MG/1; MG/1
1 TABLET ORAL EVERY 4 HOURS PRN
Status: ON HOLD | COMMUNITY
End: 2023-08-18 | Stop reason: HOSPADM

## 2023-08-17 RX ORDER — METOCLOPRAMIDE HYDROCHLORIDE 5 MG/ML
5 INJECTION INTRAMUSCULAR; INTRAVENOUS EVERY 6 HOURS PRN
Status: DISCONTINUED | OUTPATIENT
Start: 2023-08-17 | End: 2023-08-17 | Stop reason: HOSPADM

## 2023-08-17 RX ADMIN — GABAPENTIN 300 MG: 300 CAPSULE ORAL at 08:40

## 2023-08-17 RX ADMIN — METRONIDAZOLE 500 MG: 500 INJECTION, SOLUTION INTRAVENOUS at 10:18

## 2023-08-17 RX ADMIN — HYDROCODONE BITARTRATE AND ACETAMINOPHEN 1 TABLET: 5; 325 TABLET ORAL at 16:42

## 2023-08-17 RX ADMIN — PROPOFOL 200 MG: 10 INJECTION, EMULSION INTRAVENOUS at 10:07

## 2023-08-17 RX ADMIN — HYDROMORPHONE HYDROCHLORIDE 0.2 MG: 1 INJECTION, SOLUTION INTRAMUSCULAR; INTRAVENOUS; SUBCUTANEOUS at 13:38

## 2023-08-17 RX ADMIN — ACETAMINOPHEN 650 MG: 325 TABLET ORAL at 08:39

## 2023-08-17 RX ADMIN — DEXAMETHASONE SODIUM PHOSPHATE 8 MG: 4 INJECTION, SOLUTION INTRAMUSCULAR; INTRAVENOUS at 10:15

## 2023-08-17 RX ADMIN — ONDANSETRON 4 MG: 2 INJECTION INTRAMUSCULAR; INTRAVENOUS at 12:20

## 2023-08-17 RX ADMIN — MORPHINE SULFATE 2 MG: 2 INJECTION, SOLUTION INTRAMUSCULAR; INTRAVENOUS at 23:14

## 2023-08-17 RX ADMIN — GLYCOPYRROLATE 0.6 MG: 0.2 INJECTION, SOLUTION INTRAMUSCULAR; INTRAVENOUS at 12:20

## 2023-08-17 RX ADMIN — KETOROLAC TROMETHAMINE 15 MG: 30 INJECTION, SOLUTION INTRAMUSCULAR at 12:20

## 2023-08-17 RX ADMIN — SODIUM CHLORIDE, POTASSIUM CHLORIDE, SODIUM LACTATE AND CALCIUM CHLORIDE: 600; 310; 30; 20 INJECTION, SOLUTION INTRAVENOUS at 08:40

## 2023-08-17 RX ADMIN — SCOPALAMINE 1 PATCH: 1 PATCH, EXTENDED RELEASE TRANSDERMAL at 09:59

## 2023-08-17 RX ADMIN — HYDROCODONE BITARTRATE AND ACETAMINOPHEN 1 TABLET: 5; 325 TABLET ORAL at 21:08

## 2023-08-17 RX ADMIN — MORPHINE SULFATE 2 MG: 2 INJECTION, SOLUTION INTRAMUSCULAR; INTRAVENOUS at 19:04

## 2023-08-17 RX ADMIN — ROCURONIUM BROMIDE 35 MG: 10 INJECTION, SOLUTION INTRAVENOUS at 10:07

## 2023-08-17 RX ADMIN — FAMOTIDINE 20 MG: 20 TABLET, FILM COATED ORAL at 08:40

## 2023-08-17 RX ADMIN — LIDOCAINE HYDROCHLORIDE 5 ML: 20 INJECTION, SOLUTION INFILTRATION; PERINEURAL at 10:07

## 2023-08-17 RX ADMIN — CEFAZOLIN SODIUM 2000 MG: 300 INJECTION, POWDER, LYOPHILIZED, FOR SOLUTION INTRAVENOUS at 10:18

## 2023-08-17 RX ADMIN — MORPHINE SULFATE 2 MG: 4 INJECTION INTRAVENOUS at 13:20

## 2023-08-17 RX ADMIN — GABAPENTIN 100 MG: 100 CAPSULE ORAL at 21:08

## 2023-08-17 RX ADMIN — FENTANYL CITRATE 50 MCG: 50 INJECTION, SOLUTION INTRAMUSCULAR; INTRAVENOUS at 10:39

## 2023-08-17 RX ADMIN — DROPERIDOL 0.62 MG: 2.5 INJECTION, SOLUTION INTRAMUSCULAR; INTRAVENOUS at 13:29

## 2023-08-17 RX ADMIN — SODIUM CHLORIDE, POTASSIUM CHLORIDE, SODIUM LACTATE AND CALCIUM CHLORIDE: 600; 310; 30; 20 INJECTION, SOLUTION INTRAVENOUS at 10:03

## 2023-08-17 RX ADMIN — Medication 4 MG: at 12:20

## 2023-08-17 RX ADMIN — SCOLOPAMINE TRANSDERMAL SYSTEM 1 PATCH: 1 PATCH, EXTENDED RELEASE TRANSDERMAL at 09:59

## 2023-08-17 RX ADMIN — FENTANYL CITRATE 50 MCG: 50 INJECTION, SOLUTION INTRAMUSCULAR; INTRAVENOUS at 12:41

## 2023-08-17 RX ADMIN — MORPHINE SULFATE 2 MG: 4 INJECTION INTRAVENOUS at 13:30

## 2023-08-17 RX ADMIN — MIDAZOLAM HYDROCHLORIDE 2 MG: 1 INJECTION, SOLUTION INTRAMUSCULAR; INTRAVENOUS at 10:05

## 2023-08-17 ASSESSMENT — PAIN DESCRIPTION - PAIN TYPE: TYPE: ACUTE PAIN

## 2023-08-17 ASSESSMENT — PAIN SCALES - GENERAL
PAINLEVEL_OUTOF10: 6
PAINLEVEL_OUTOF10: 2
PAINLEVEL_OUTOF10: 5
PAINLEVEL_OUTOF10: 4
PAINLEVEL_OUTOF10: 4
PAINLEVEL_OUTOF10: 6
PAINLEVEL_OUTOF10: 6
PAINLEVEL_OUTOF10: 7
PAINLEVEL_OUTOF10: 7
PAINLEVEL_OUTOF10: 0

## 2023-08-17 ASSESSMENT — PAIN SCALES - WONG BAKER: WONGBAKER_NUMERICALRESPONSE: 0

## 2023-08-18 ENCOUNTER — APPOINTMENT (OUTPATIENT)
Dept: ULTRASOUND IMAGING | Age: 38
End: 2023-08-18
Attending: FAMILY MEDICINE

## 2023-08-18 VITALS
BODY MASS INDEX: 24.76 KG/M2 | OXYGEN SATURATION: 99 % | RESPIRATION RATE: 18 BRPM | TEMPERATURE: 98.1 F | DIASTOLIC BLOOD PRESSURE: 72 MMHG | HEIGHT: 65 IN | SYSTOLIC BLOOD PRESSURE: 107 MMHG | WEIGHT: 148.59 LBS | HEART RATE: 73 BPM

## 2023-08-18 PROBLEM — G89.18 POSTOPERATIVE PAIN: Status: ACTIVE | Noted: 2023-08-18

## 2023-08-18 PROCEDURE — G0378 HOSPITAL OBSERVATION PER HR: HCPCS

## 2023-08-18 PROCEDURE — 10002803 HB RX 637: Performed by: OBSTETRICS & GYNECOLOGY

## 2023-08-18 RX ORDER — HYDROCODONE BITARTRATE AND ACETAMINOPHEN 5; 325 MG/1; MG/1
1 TABLET ORAL EVERY 6 HOURS PRN
Status: ON HOLD | COMMUNITY
End: 2023-08-18 | Stop reason: HOSPADM

## 2023-08-18 RX ADMIN — HYDROCODONE BITARTRATE AND ACETAMINOPHEN 1 TABLET: 5; 325 TABLET ORAL at 10:50

## 2023-08-18 RX ADMIN — HYDROCODONE BITARTRATE AND ACETAMINOPHEN 1 TABLET: 5; 325 TABLET ORAL at 07:05

## 2023-08-18 RX ADMIN — GABAPENTIN 100 MG: 100 CAPSULE ORAL at 08:56

## 2023-08-18 RX ADMIN — HYDROCODONE BITARTRATE AND ACETAMINOPHEN 1 TABLET: 5; 325 TABLET ORAL at 03:11

## 2023-08-18 ASSESSMENT — PAIN SCALES - GENERAL
PAINLEVEL_OUTOF10: 6
PAINLEVEL_OUTOF10: 6
PAINLEVEL_OUTOF10: 4

## 2023-08-19 LAB
ASR DISCLAIMER: NORMAL
CASE RPRT: NORMAL
CLINICAL INFO: NORMAL
PATH REPORT.FINAL DX SPEC: NORMAL
PATH REPORT.GROSS SPEC: NORMAL
PATH REPORT.INTRAOP OBS SPEC DOC: NORMAL

## 2023-08-21 LAB
ASR DISCLAIMER: NORMAL
CASE RPRT: NORMAL
CLINICAL INFO: NORMAL
PATH REPORT.FINAL DX SPEC: NORMAL
PATH REPORT.GROSS SPEC: NORMAL

## 2023-11-14 ENCOUNTER — LAB SERVICES (OUTPATIENT)
Dept: LAB | Age: 38
End: 2023-11-14

## 2023-11-14 DIAGNOSIS — R19.09 OTHER INTRA-ABDOMINAL AND PELVIC SWELLING, MASS AND LUMP: ICD-10-CM

## 2023-11-14 DIAGNOSIS — R10.2 PELVIC AND PERINEAL PAIN: Primary | ICD-10-CM

## 2023-11-14 DIAGNOSIS — N93.9 ABNORMAL UTERINE AND VAGINAL BLEEDING, UNSPECIFIED: ICD-10-CM

## 2023-11-14 PROCEDURE — 86301 IMMUNOASSAY TUMOR CA 19-9: CPT | Performed by: CLINICAL MEDICAL LABORATORY

## 2023-11-14 PROCEDURE — 36415 COLL VENOUS BLD VENIPUNCTURE: CPT | Performed by: CLINICAL MEDICAL LABORATORY

## 2023-11-15 LAB — CANCER AG19-9 SERPL-ACNC: 21 UNITS/ML (ref 0–35)

## 2023-11-27 ENCOUNTER — NURSE ONLY (OUTPATIENT)
Dept: INTERNAL MEDICINE CLINIC | Facility: HOSPITAL | Age: 38
End: 2023-11-27
Attending: EMERGENCY MEDICINE

## 2023-11-27 DIAGNOSIS — Z00.00 WELLNESS EXAMINATION: Primary | ICD-10-CM

## 2023-11-27 LAB
HBV SURFACE AB SER QL: REACTIVE
HBV SURFACE AB SERPL IA-ACNC: 148.12 MIU/ML

## 2023-11-27 PROCEDURE — 86480 TB TEST CELL IMMUN MEASURE: CPT

## 2023-11-27 PROCEDURE — 86706 HEP B SURFACE ANTIBODY: CPT

## 2023-11-28 LAB
M TB IFN-G CD4+ T-CELLS BLD-ACNC: 0.01 IU/ML
M TB TUBERC IFN-G BLD QL: NEGATIVE
M TB TUBERC IGNF/MITOGEN IGNF CONTROL: >10 IU/ML
QFT TB1 AG MINUS NIL: 0.05 IU/ML
QFT TB2 AG MINUS NIL: 0.04 IU/ML

## 2024-01-09 ENCOUNTER — TELEMEDICINE (OUTPATIENT)
Dept: INTERNAL MEDICINE CLINIC | Facility: CLINIC | Age: 39
End: 2024-01-09
Payer: COMMERCIAL

## 2024-01-09 DIAGNOSIS — T78.40XA ALLERGY, INITIAL ENCOUNTER: Primary | ICD-10-CM

## 2024-01-09 DIAGNOSIS — R05.1 ACUTE COUGH: ICD-10-CM

## 2024-01-09 PROCEDURE — 99203 OFFICE O/P NEW LOW 30 MIN: CPT | Performed by: NURSE PRACTITIONER

## 2024-01-09 RX ORDER — PREDNISONE 20 MG/1
TABLET ORAL
Qty: 8 TABLET | Refills: 0 | Status: SHIPPED | OUTPATIENT
Start: 2024-01-09 | End: 2024-01-10

## 2024-01-09 RX ORDER — FLUTICASONE PROPIONATE AND SALMETEROL XINAFOATE 45; 21 UG/1; UG/1
1 AEROSOL, METERED RESPIRATORY (INHALATION) 2 TIMES DAILY
Qty: 1 EACH | Refills: 1 | Status: SHIPPED | OUTPATIENT
Start: 2024-01-09 | End: 2024-04-08

## 2024-01-09 RX ORDER — ALBUTEROL SULFATE 90 UG/1
2 AEROSOL, METERED RESPIRATORY (INHALATION) EVERY 6 HOURS PRN
Qty: 8.5 G | Refills: 1 | Status: SHIPPED | OUTPATIENT
Start: 2024-01-09

## 2024-01-09 RX ORDER — CETIRIZINE HYDROCHLORIDE 10 MG/1
1 CAPSULE, LIQUID FILLED ORAL DAILY
Qty: 30 CAPSULE | Refills: 0 | OUTPATIENT
Start: 2024-01-09 | End: 2024-02-08

## 2024-01-09 NOTE — ASSESSMENT & PLAN NOTE
Acute cough for about two months.  Change in environment.   Now with cats in the house.  Allergy Treatment Plan  -Take otc allergy medications as directed (over the counter, generic Claritin, Zyrtec, Xyzal or Allegra)     -use of steroidal nasal spray as advised (Flonase, Rhinocort)-this is now available as a generic, over the counter spray if your insurance doesn't cover it      used every morning faithfully each morning during the allergy season is the BEST treatment; they are very safe for use over a period of 6-7 months (April - October)     -keep windows closed at night     -do not allow pets to sleep in room     -use allergen covers on pillows, mattress and box spring to reduce exposure to dust mites        -shower after outdoor activities, especially when allergy counts are high     -Use of allergen filters for furnace; consider air purifier if allergies are significant at night    Refill - albuterol inhaler  Steroid inhaler if necessary  Prednisone -40 mg x four days  Allergy food panel         -Call  If symptoms do not improve, worsen or with other questions or concerns

## 2024-01-09 NOTE — PROGRESS NOTES
HPI:    Patient ID: Nancy Bhat is a 38 year old female.  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 This has been done in good bernardo to provide continuity of care in the best interest of the provider-patient relationship, due to the COVID -19 public health crisis/national emergency where restrictions of face-to-face office visits are ongoing. Every conscious effort was taken to allow for sufficient and adequate time to complete the visit.  The patient was made aware of the limitations of the telehealth visit, including treatment limitations as no physical exam could be performed.  The patient was advised to call 911 or to go to the ER in case there was an emergency.  The patient was also advised of the potential privacy & security concerns related to the telehealth platform.   The patient was made aware of where to find [facility]'s notice of privacy practices, telehealth consent form and other related consent forms and documents.  which are located on the [facility] website. The patient verbally agreed to telehealth consent form, related consents and the risks discussed.    Lastly, the patient confirmed that they were in [place].   Included in this visit, time may have been spent reviewing labs, medications, radiology tests and decision making. Appropriate medical decision-making and tests are ordered as detailed in the plan of care above.  Coding/billing information is submitted for this visit based on complexity of care and/or time spent for the visit.  15  minutes  HPI Cough  38 year old female I am meeting for the first time on video visit.  She had an URI infection a little over two months ago and has been coughing.  She is allergic to shell fish and had an anaphylaxis reaction I the past and she carries a Epi pen.     She started  a new job at [employer] in the [employer]  She has had this cough for two months.She is now living with her boyfriend he he has two cats. She is wondering if she has an allergy to these cats.  She does not sleep with the cats.    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    Prediabetes     Thyroiditis, autoimmune     Vitamin D deficiency 3/7/13      Past Surgical History:   Procedure Laterality Date    Other surgical history      wisom teeth       Social History     Socioeconomic History    Marital status: Single    Number of children: 0   Occupational History    Occupation: RN   Tobacco Use    Smoking status: Never    Smokeless tobacco: Never   Vaping Use    Vaping Use: Never used   Substance and Sexual Activity    Alcohol use: Yes     Alcohol/week: 0.0 - 0.8 standard drinks of alcohol     Comment: SOCIAL    Drug use: No    Sexual activity: Yes     Partners: Female          Review of Systems   Constitutional:  Negative for chills, fatigue and fever.   HENT:  Positive for postnasal drip. Negative for congestion, ear discharge, ear pain, facial swelling, hearing loss, sinus pressure, sore throat and trouble swallowing.    Eyes:  Negative for pain, discharge, redness and visual disturbance.   Respiratory:  Positive for chest tightness and wheezing. Negative for cough and shortness of breath.    Cardiovascular:  Negative for chest pain, palpitations and leg swelling.   Gastrointestinal:  Negative for abdominal distention, abdominal pain, constipation, diarrhea, nausea and vomiting.   Endocrine: Negative for cold intolerance, heat intolerance, polydipsia, polyphagia and  polyuria.   Genitourinary:  Negative for difficulty urinating, dysuria, pelvic pain and vaginal bleeding.   Musculoskeletal:  Negative for back pain, gait problem, neck pain and neck stiffness.   Skin:  Negative for color change and rash.   Allergic/Immunologic: Positive for environmental allergies and food allergies.   Neurological:  Negative for dizziness, seizures, weakness and headaches.   Psychiatric/Behavioral:  Negative for agitation, dysphoric mood and sleep disturbance. The patient is nervous/anxious.               Current Outpatient Medications   Medication Sig Dispense Refill    predniSONE 20 MG Oral Tab 2 tabs daily for 4 days. 8 tablet 0    Cetirizine HCl (ZYRTEC ALLERGY) 10 MG Oral Cap Take 1 tablet by mouth daily. 30 capsule 0    albuterol (PROAIR HFA) 108 (90 Base) MCG/ACT Inhalation Aero Soln Inhale 2 puffs into the lungs every 6 (six) hours as needed for Wheezing. 8.5 g 1    fluticasone-salmeterol 45-21 MCG/ACT Inhalation Aerosol Inhale 1 puff into the lungs 2 (two) times daily. 1 each 1    Cholecalciferol (VITAMIN D) 50 MCG (2000 UT) Oral Cap Take 2,000 Units by mouth daily.      Fluticasone Propionate 50 MCG/ACT Nasal Suspension 2 sprays by Each Nare route daily. 3 Bottle 1    Diethylpropion HCl ER 75 MG Oral Tablet 24 Hr Take 1 tab daily (Patient not taking: Reported on 3/15/2022) 30 tablet 1    ergocalciferol 1.25 MG (30705 UT) Oral Cap Take 1 cap WEEKLY for 6 months. 12 capsule 1    EPINEPHrine (EPIPEN 2-ALBERT) 0.3 MG/0.3ML Injection Solution Auto-injector Use as directed for symptoms of anaphylaxis 1 each 1     Allergies:  Allergies   Allergen Reactions    Shellfish ANAPHYLAXIS    Ultram [Tramadol Hcl] SWELLING    Bactrim [Sulfamethoxazole W/Trimethoprim] OTHER (SEE COMMENTS)     Debilitating headaches      Iodine (Topical)       PHYSICAL EXAM:   Physical Exam  Constitutional:       Appearance: Normal appearance.   HENT:      Head: Normocephalic.      Mouth/Throat:      Mouth: Mucous membranes  are moist.   Pulmonary:      Effort: Pulmonary effort is normal.   Musculoskeletal:         General: Normal range of motion.   Skin:     General: Skin is dry.   Neurological:      Mental Status: She is alert and oriented to person, place, and time.   Psychiatric:         Mood and Affect: Mood normal.         Behavior: Behavior normal.         Thought Content: Thought content normal.         Judgment: Judgment normal.       There were no vitals taken for this visit.  Wt Readings from Last 2 Encounters:   07/22/21 179 lb (81.2 kg)   03/24/21 183 lb (83 kg)     There is no height or weight on file to calculate BMI.(2)  Lab Results   Component Value Date    WBC 6.13 10/01/2020    RBC 4.74 10/01/2020    HGB 13.3 10/01/2020    HCT 42.2 10/01/2020    MCV 89.0 10/01/2020    MCH 28.1 10/01/2020    MCHC 31.5 10/01/2020    RDW 12.9 10/01/2020     10/01/2020      Lab Results   Component Value Date    GLU 94 10/01/2020    BUN 9.0 10/01/2020    BUNCREA 12.0 10/01/2020    CREATSERUM 0.74 10/01/2020    CA 9.4 10/01/2020    ALKPHO 61 04/06/2020    AST 14 04/06/2020    ALT 11 10/01/2020    BILT 0.32 04/06/2020    TP 7.2 04/06/2020    ALB 4.2 04/06/2020    AGRATIO 1.7 03/06/2013     10/01/2020    K 4.34 10/01/2020     10/01/2020    CO2 23.9 10/01/2020      Lab Results   Component Value Date     10/28/2021    A1C 5.6 10/28/2021      Lab Results   Component Value Date    CHOLEST 230.00 (H) 10/01/2020    TRIG 92.00 10/01/2020    HDL 51 10/01/2020     (H) 10/01/2020    VLDL 18 10/01/2020      Lab Results   Component Value Date    T4F 1.02 02/25/2021    TSH 0.752 10/28/2021                ASSESSMENT/PLAN:     Problem List Items Addressed This Visit       Acute cough     Acute cough for about two months.  Change in environment.   Now with cats in the house.  Allergy Treatment Plan  -Take otc allergy medications as directed (over the counter, generic Claritin, Zyrtec, Xyzal or Allegra)     -use of steroidal  nasal spray as advised (Flonase, Rhinocort)-this is now available as a generic, over the counter spray if your insurance doesn't cover it      used every morning faithfully each morning during the allergy season is the BEST treatment; they are very safe for use over a period of 6-7 months (April - October)     -keep windows closed at night     -do not allow pets to sleep in room     -use allergen covers on pillows, mattress and box spring to reduce exposure to dust mites        -shower after outdoor activities, especially when allergy counts are high     -Use of allergen filters for furnace; consider air purifier if allergies are significant at night    Refill - albuterol inhaler  Steroid inhaler if necessary  Prednisone -40 mg x four days  Allergy food panel         -Call  If symptoms do not improve, worsen or with other questions or concerns          Relevant Medications    predniSONE 20 MG Oral Tab    albuterol (PROAIR HFA) 108 (90 Base) MCG/ACT Inhalation Aero Soln    fluticasone-salmeterol 45-21 MCG/ACT Inhalation Aerosol     Other Visit Diagnoses       Allergy, initial encounter    -  Primary    Relevant Orders    Adult Food Allergy Prof [E]               Orders Placed This Encounter   Procedures    Adult Food Allergy Prof [E]       Meds This Visit:  Requested Prescriptions     Signed Prescriptions Disp Refills    predniSONE 20 MG Oral Tab 8 tablet 0     Si tabs daily for 4 days.    Cetirizine HCl (ZYRTEC ALLERGY) 10 MG Oral Cap 30 capsule 0     Sig: Take 1 tablet by mouth daily.    albuterol (PROAIR HFA) 108 (90 Base) MCG/ACT Inhalation Aero Soln 8.5 g 1     Sig: Inhale 2 puffs into the lungs every 6 (six) hours as needed for Wheezing.    fluticasone-salmeterol 45-21 MCG/ACT Inhalation Aerosol 1 each 1     Sig: Inhale 1 puff into the lungs 2 (two) times daily.       Imaging & Referrals:  None         TARAN Chung

## 2024-01-10 ENCOUNTER — TELEPHONE (OUTPATIENT)
Dept: INTERNAL MEDICINE CLINIC | Facility: CLINIC | Age: 39
End: 2024-01-10

## 2024-01-10 RX ORDER — PREDNISONE 20 MG/1
TABLET ORAL
Qty: 8 TABLET | Refills: 0 | Status: SHIPPED | OUTPATIENT
Start: 2024-01-10

## 2024-01-10 NOTE — TELEPHONE ENCOUNTER
Left detailed voicemail (ED) to call back our office or send a [employer] message if has any questions. Office phone number provided with telephone hours.      Prednisone resent to [facility] per patient's request

## 2024-01-10 NOTE — TELEPHONE ENCOUNTER
Patient called stating that this medication went to the wrong pharmacy. Per patient she would like it to be sent to another pharmacy.    Medication name     predniSONE 20 MG Oral Tab       Pharmacy:    [facility] [id number]

## 2024-03-04 ENCOUNTER — TELEMEDICINE (OUTPATIENT)
Dept: INTERNAL MEDICINE CLINIC | Facility: CLINIC | Age: 39
End: 2024-03-04
Payer: COMMERCIAL

## 2024-03-04 DIAGNOSIS — F41.9 ANXIETY: ICD-10-CM

## 2024-03-04 DIAGNOSIS — J01.00 ACUTE NON-RECURRENT MAXILLARY SINUSITIS: Primary | ICD-10-CM

## 2024-03-04 PROCEDURE — 99214 OFFICE O/P EST MOD 30 MIN: CPT | Performed by: NURSE PRACTITIONER

## 2024-03-04 RX ORDER — ESCITALOPRAM OXALATE 10 MG/1
10 TABLET ORAL DAILY
Qty: 30 TABLET | Refills: 2 | Status: SHIPPED | OUTPATIENT
Start: 2024-03-04

## 2024-03-04 RX ORDER — AMOXICILLIN AND CLAVULANATE POTASSIUM 875; 125 MG/1; MG/1
1 TABLET, FILM COATED ORAL 2 TIMES DAILY
Qty: 20 TABLET | Refills: 0 | Status: SHIPPED | OUTPATIENT
Start: 2024-03-04 | End: 2024-03-14

## 2024-03-04 NOTE — PROGRESS NOTES
HPI:    Patient ID: Nancy Bhat is a 38 year old female.  Telehealth outside of [facility]  Telehealth Verbal Consent   I conducted a telehealth visit with Nancy Bhat today, 03/04/24, which was completed using two-way, real-time interactive audio and video communication. This has been done in good bernardo to provide continuity of care in the best interest of the provider-patient relationship, due to the COVID -19 public health crisis/national emergency where restrictions of face-to-face office visits are ongoing. Every conscious effort was taken to allow for sufficient and adequate time to complete the visit.  The patient was made aware of the limitations of the telehealth visit, including treatment limitations as no physical exam could be performed.  The patient was advised to call 911 or to go to the ER in case there was an emergency.  The patient was also advised of the potential privacy & security concerns related to the telehealth platform.   The patient was made aware of where to find [facility]'s notice of privacy practices, telehealth consent form and other related consent forms and documents.  which are located on the [facility] website. The patient verbally agreed to telehealth consent form, related consents and the risks discussed.    Lastly, the patient confirmed that they were in [place].   Included in this visit, time may have been spent reviewing labs, medications, radiology tests and decision making. Appropriate medical decision-making and tests are ordered as detailed in the plan of care above.  Coding/billing information is submitted for this visit based on complexity of care and/or time spent for the visit.  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  Patient was prescribed Lexapro-by an external provider. She takes Lexapro 10mg every night and it has helped.    Flu two weeks ago.  She had flu symptoms and now worsening sinus pain and pressure.    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    Prediabetes     Thyroiditis, autoimmune     Vitamin D deficiency 3/7/13      Past Surgical History:   Procedure Laterality Date    Other surgical history      wisom teeth       Social History     Socioeconomic History    Marital status: Single    Number of children: 0   Occupational History    Occupation: RN   Tobacco Use    Smoking status: Never    Smokeless tobacco: Never   Vaping Use    Vaping Use: Never used   Substance and Sexual Activity    Alcohol use: Yes     Alcohol/week: 0.0 - 0.8 standard drinks of alcohol     Comment: SOCIAL    Drug use: No    Sexual activity: Yes     Partners: Female          Review of Systems   Constitutional:  Negative for chills, fatigue and fever.   HENT:  Positive for congestion, sinus pressure, sinus pain and sore throat. Negative for ear discharge, ear pain, facial swelling, hearing loss, postnasal drip and trouble swallowing.    Eyes:  Negative for pain, discharge, redness and visual disturbance.   Respiratory:  Negative for cough, chest tightness, shortness of breath and wheezing.    Cardiovascular:  Negative for chest pain, palpitations and leg swelling.   Gastrointestinal:  Negative for abdominal distention, abdominal pain, constipation, diarrhea, nausea and vomiting.   Endocrine: Negative for cold intolerance, heat intolerance, polydipsia, polyphagia and polyuria.   Genitourinary:  Negative for difficulty urinating, dysuria, pelvic pain and vaginal bleeding.   Musculoskeletal:  Negative for back pain, gait problem, neck pain and neck stiffness.   Skin:  Negative for color change and rash.    Neurological:  Negative for dizziness, seizures, weakness and headaches.   Psychiatric/Behavioral:  Negative for agitation and sleep disturbance. The patient is not nervous/anxious.               Current Outpatient Medications   Medication Sig Dispense Refill    amoxicillin clavulanate 875-125 MG Oral Tab Take 1 tablet by mouth 2 (two) times daily for 10 days. 20 tablet 0    escitalopram (LEXAPRO) 10 MG Oral Tab Take 1 tablet (10 mg total) by mouth daily. 30 tablet 2    predniSONE 20 MG Oral Tab 2 tabs daily for 4 days. 8 tablet 0    albuterol (PROAIR HFA) 108 (90 Base) MCG/ACT Inhalation Aero Soln Inhale 2 puffs into the lungs every 6 (six) hours as needed for Wheezing. 8.5 g 1    fluticasone-salmeterol 45-21 MCG/ACT Inhalation Aerosol Inhale 1 puff into the lungs 2 (two) times daily. 1 each 1    Diethylpropion HCl ER 75 MG Oral Tablet 24 Hr Take 1 tab daily (Patient not taking: Reported on 3/15/2022) 30 tablet 1    ergocalciferol 1.25 MG (76482 UT) Oral Cap Take 1 cap WEEKLY for 6 months. 12 capsule 1    Cholecalciferol (VITAMIN D) 50 MCG (2000 UT) Oral Cap Take 2,000 Units by mouth daily.      EPINEPHrine (EPIPEN 2-ALBERT) 0.3 MG/0.3ML Injection Solution Auto-injector Use as directed for symptoms of anaphylaxis 1 each 1    Fluticasone Propionate 50 MCG/ACT Nasal Suspension 2 sprays by Each Nare route daily. 3 Bottle 1     Allergies:  Allergies   Allergen Reactions    Shellfish ANAPHYLAXIS    Ultram [Tramadol Hcl] SWELLING    Bactrim [Sulfamethoxazole W/Trimethoprim] OTHER (SEE COMMENTS)     Debilitating headaches      Iodine (Topical)       PHYSICAL EXAM:   Physical Exam  Constitutional:       Appearance: Normal appearance.   HENT:      Head: Normocephalic.      Mouth/Throat:      Mouth: Mucous membranes are moist.   Pulmonary:      Effort: Pulmonary effort is normal.   Musculoskeletal:         General: Normal range of motion.   Skin:     General: Skin is dry.   Neurological:      Mental Status: She is alert and  oriented to person, place, and time.   Psychiatric:         Mood and Affect: Mood normal.         Behavior: Behavior normal.         Thought Content: Thought content normal.         Judgment: Judgment normal.       There were no vitals taken for this visit.  Wt Readings from Last 2 Encounters:   07/22/21 179 lb (81.2 kg)   03/24/21 183 lb (83 kg)     There is no height or weight on file to calculate BMI.(2)  Lab Results   Component Value Date    WBC 6.13 10/01/2020    RBC 4.74 10/01/2020    HGB 13.3 10/01/2020    HCT 42.2 10/01/2020    MCV 89.0 10/01/2020    MCH 28.1 10/01/2020    MCHC 31.5 10/01/2020    RDW 12.9 10/01/2020     10/01/2020      Lab Results   Component Value Date    GLU 94 10/01/2020    BUN 9.0 10/01/2020    BUNCREA 12.0 10/01/2020    CREATSERUM 0.74 10/01/2020    CA 9.4 10/01/2020    ALKPHO 61 04/06/2020    AST 14 04/06/2020    ALT 11 10/01/2020    BILT 0.32 04/06/2020    TP 7.2 04/06/2020    ALB 4.2 04/06/2020    AGRATIO 1.7 03/06/2013     10/01/2020    K 4.34 10/01/2020     10/01/2020    CO2 23.9 10/01/2020      Lab Results   Component Value Date     10/28/2021    A1C 5.6 10/28/2021      Lab Results   Component Value Date    CHOLEST 230.00 (H) 10/01/2020    TRIG 92.00 10/01/2020    HDL 51 10/01/2020     (H) 10/01/2020    VLDL 18 10/01/2020      Lab Results   Component Value Date    T4F 1.02 02/25/2021    TSH 0.752 10/28/2021                ASSESSMENT/PLAN:     Problem List Items Addressed This Visit       Acute non-recurrent maxillary sinusitis - Primary     Patient has had upper respiratory symptoms for two weeks and not improving with over-the-counter medications.  Will empirically treat with Augmentin twice daily for 10 days with meals.  Flonase 2 sprays in each nostril once daily.  Increase fluid intake to stay well-hydrated.  Contact the office if symptoms worsen or do not improve.   - Apply warm compresses over maxillary area several times a day.    Her nasal  congestion and sinus congestion could be allergy related since she moved in with her boyfriend who has two cats.         Relevant Medications    amoxicillin clavulanate 875-125 MG Oral Tab    Anxiety     Anxiety- Improved on Lexapro.  She is requesting a refill.      Plan   escitalopram (LEXAPRO) 10 MG Oral Tab 30 tablet 2     Sig: Take 1 tablet (10 mg total) by mouth daily.            Relevant Medications    escitalopram (LEXAPRO) 10 MG Oral Tab          No orders of the defined types were placed in this encounter.      Meds This Visit:  Requested Prescriptions     Signed Prescriptions Disp Refills    amoxicillin clavulanate 875-125 MG Oral Tab 20 tablet 0     Sig: Take 1 tablet by mouth 2 (two) times daily for 10 days.    escitalopram (LEXAPRO) 10 MG Oral Tab 30 tablet 2     Sig: Take 1 tablet (10 mg total) by mouth daily.       Imaging & Referrals:  None         TARAN Chung

## 2024-03-04 NOTE — ASSESSMENT & PLAN NOTE
Patient has had upper respiratory symptoms for two weeks and not improving with over-the-counter medications.  Will empirically treat with Augmentin twice daily for 10 days with meals.  Flonase 2 sprays in each nostril once daily.  Increase fluid intake to stay well-hydrated.  Contact the office if symptoms worsen or do not improve.   - Apply warm compresses over maxillary area several times a day.    Her nasal congestion and sinus congestion could be allergy related since she moved in with her boyfriend who has two cats.

## 2024-03-04 NOTE — ASSESSMENT & PLAN NOTE
Anxiety- Improved on Lexapro.  She is requesting a refill.      Plan   escitalopram (LEXAPRO) 10 MG Oral Tab 30 tablet 2     Sig: Take 1 tablet (10 mg total) by mouth daily.

## 2024-06-24 RX ORDER — ESCITALOPRAM OXALATE 10 MG/1
10 TABLET ORAL DAILY
Qty: 90 TABLET | Refills: 0 | Status: SHIPPED | OUTPATIENT
Start: 2024-06-24

## 2024-06-24 NOTE — TELEPHONE ENCOUNTER
Please review; protocol failed/ has no protocol    Requested Prescriptions   Pending Prescriptions Disp Refills    ESCITALOPRAM 10 MG Oral Tab [Pharmacy Med Name: ESCITALOPRAM 10 MG TABLET] 90 tablet 0     Sig: TAKE 1 TABLET BY MOUTH EVERY DAY       Psychiatric Non-Scheduled (Anti-Anxiety) Failed - 6/21/2024 12:12 AM        Failed - Depression Screening completed within the past 12 months        Passed - In person appointment or virtual visit in the past 6 mos or appointment in next 3 mos     Recent Outpatient Visits              3 months ago [facility]    5 months ago Allergy, initial encounter    [facility] Jeanie Cota [facility]    Telemedicine    7 months ago Wellness examination    [facility]    Nurse Only    2 years ago Seborrheic keratoses    [facility]    [facility]    2 years ago Encounter for therapeutic drug monitoring    [facility] - [address], Katie, DO Telemedicine                         Recent Outpatient Visits              3 months ago Acute non-recurrent maxillary sinusitis    [facility]    [facility]    5 months ago Allergy, initial encounter    [facility] Jeanie Cota [facility]    Telemedicine    7 months ago Wellness examination    [facility]    Nurse Only    2 years ago Seborrheic keratoses    [facility]    [facility]    2 years ago Encounter for therapeutic drug monitoring    [facility] - [address], Katie, DO Telemedicine

## 2024-07-20 ENCOUNTER — TELEPHONE (OUTPATIENT)
Dept: INTERNAL MEDICINE CLINIC | Facility: HOSPITAL | Age: 39
End: 2024-07-20

## 2024-07-20 NOTE — TELEPHONE ENCOUNTER
Outside Covid Testing done    Results and RTW guidelines:    COVID RESULT reported:      Test type:    [] Rapid outside         [] PCR outside       [x] Home Test    Date of test: 7/20     Test location: home         [] Result viewed in Epic with verbal consent received from employee     [x] Results per Employee Covid Dashboard    [] Employee instructed to email copy of result to [email or web address]       [x] Discussed with employee     [] Unable to reach by phone.  Sent via [employer] message    [x] Positive    - Employee should quarantine at home for at least 5 days (day 1 is day after sx onset) , follow the CDC guidelines for cleaning and                              quarantining; see CDC.gov   -This employee may RTW on day 6 if asymptomatic or mildly symptomatic (with improving symptoms).  Call Employee Health on day 5 if unable to return on                      day 6 after symptom onset.    -This employee needs to call Employee Health on day 5 after symptom onset.  The employee needs to be cleared by Employee Health.  - If Employee is still experiencing severe symptoms must make a RTW appt with Employee Health, Employee will not be cleared if:    1. Has consistent cough, shortness of breath or fatigue that restricts your physical activities    2. Is still feeling \"unwell\"    3. Within 15 days of hospitalization for COVID    4. Within 20 days of intubation for COVID    5. Still has a fever, vomiting or diarrhea   - Keep communication open with management about RTW and if symptoms worsen    - Monitor sx and temperature    - Employee should quarantine at home for at least 5 days from sx onset, follow the CDC guidelines for cleaning and quarantining; see CDC.gov                  - Notify PCP of result                 - Seek emergent care with worsening symptoms        Notes:     RTW PLAN:    [x]  If COVID positive results, off work minimum of 5 days from positive test or onset of symptoms (day 0)        On day  5, if asymptomatic or mildly symptomatic (with improving symptoms) may return to work day 6          On day 5, if symptomatic, call Employee Health for RTW screening        []  COVID positive result - call Employee Health on day 5 after symptom onset.  The employee needs to be cleared by Employee Health to RTW.  [] RTW immediately, continue to monitor for sx  [] RTW when sx improve; must be fever free for 24 hours w/o medications, Diarrhea/Vomiting free for 24 hours w/o medications  [] Alinity ordered; continue to monitor sx and call for new/worsening sx.  Discuss RTW guidelines with manager             [] Rapid ordered to confirm home negative.   [] May continue to work  [] Follow up with PCP  [] Home until further instruction from hotline with Alinity results  INSTRUCTIONS PROVIDED:  [x]  Plan as noted above  []  Length of time to obtain results  []  May return to work if views negative in My Chart and  remains fever, vomiting, and diarrhea free  []  May continue to work if remains asymptomatic   [x]  Quarantine instructions  [x]  Masking protocol  [x]  S/S of worsening infection/condition and importance of prompt medical re-evaluation including when to seek emergency care  [] If symptoms develop, stay home and call hotline for rapid test order    Estimated RTW date:  7/26  [x] Manager notified        [] MEJIA  []HA   [x] [facility]  Manager : Melissa Brewer    [x] Direct Patient Care  []Indirect Patient Contact   [] Non-Clinical/No Patient Contact    High Risk Area:    [x] Yes   [] No    For Direct Patient Care ONLY: Have you been fitted with an N95 mask? [x] Yes  []No      HAVE YOU RECEIVED THE COVID-19 Vaccine? Yes [x]    No []          If yes, date(s) received:  3/23/21; 4/14/21          Which vaccine:  Pfizer [x]     Moderna []    J&J []      SYMPTOMS (reported via dashboard):  [] asymptomatic  [x] symptomatic  [] GI symptoms only    Symptom onset date: 7/20    Fever   > 100F             Yes []      Cough                           Yes [x]      Shortness of breath  Yes [x]      Congestion                 Yes [x]      Runny nose                Yes [x]        Loss of Smell              Yes []        Loss of Taste             Yes []       Sore throat                 Yes [x]       Fatigue                        Yes [x]       Body Aches                Yes [x]        Chills                           Yes [x]        Headache                   Yes []             GI symptoms             Yes [x]     No []                     Nausea   []          Vomiting            []                                    Diarrhea  []          Upset stomach [x]      Employee reported COVID Exposure?  Yes []     No [x]    Date of exposure:   []  Coworker                       [] patient                        [] Family/friend    PPE:   [] N95 Mask/PAPR  [] Standard Mask  [] Eyewear  [] None    Within 6 feet for >15 minutes? [] Yes []  No    Is this a true exposure? []  Yes []  No    When was the last shift you worked?: 7/18    Employee has a history of Covid?  Yes [x]     No []   If Yes, when: 2023    Notes:

## 2024-09-11 ENCOUNTER — LAB ENCOUNTER (OUTPATIENT)
Dept: LAB | Age: 39
End: 2024-09-11
Payer: COMMERCIAL

## 2024-09-11 DIAGNOSIS — Z90.711 HISTORY OF PARTIAL HYSTERECTOMY: ICD-10-CM

## 2024-09-11 DIAGNOSIS — F41.9 ANXIETY AND DEPRESSION: ICD-10-CM

## 2024-09-11 DIAGNOSIS — F32.A ANXIETY AND DEPRESSION: ICD-10-CM

## 2024-09-11 DIAGNOSIS — E66.9 CLASS 1 OBESITY WITHOUT SERIOUS COMORBIDITY WITH BODY MASS INDEX (BMI) OF 30.0 TO 30.9 IN ADULT, UNSPECIFIED OBESITY TYPE: ICD-10-CM

## 2024-09-11 LAB
EST. AVERAGE GLUCOSE BLD GHB EST-MCNC: 117 MG/DL (ref 68–126)
FSH SERPL-ACNC: 9 MIU/ML
HBA1C MFR BLD: 5.7 % (ref ?–5.7)
INSULIN SERPL-ACNC: 6 MU/L (ref 3–25)
LH SERPL-ACNC: 14.9 MIU/ML
TSI SER-ACNC: 0.82 MIU/ML (ref 0.55–4.78)
VIT D+METAB SERPL-MCNC: 29.2 NG/ML (ref 30–100)

## 2024-09-11 PROCEDURE — 82306 VITAMIN D 25 HYDROXY: CPT

## 2024-09-11 PROCEDURE — 84443 ASSAY THYROID STIM HORMONE: CPT

## 2024-09-11 PROCEDURE — 83002 ASSAY OF GONADOTROPIN (LH): CPT

## 2024-09-11 PROCEDURE — 83036 HEMOGLOBIN GLYCOSYLATED A1C: CPT

## 2024-09-11 PROCEDURE — 83001 ASSAY OF GONADOTROPIN (FSH): CPT

## 2024-09-11 PROCEDURE — 83525 ASSAY OF INSULIN: CPT

## 2024-09-11 PROCEDURE — 36415 COLL VENOUS BLD VENIPUNCTURE: CPT

## 2024-09-27 RX ORDER — ESCITALOPRAM OXALATE 10 MG/1
10 TABLET ORAL DAILY
Qty: 90 TABLET | Refills: 0 | OUTPATIENT
Start: 2024-09-27

## 2024-09-27 RX ORDER — ESCITALOPRAM OXALATE 10 MG/1
10 TABLET ORAL DAILY
Qty: 90 TABLET | Refills: 0 | Status: SHIPPED | OUTPATIENT
Start: 2024-09-27

## 2024-09-27 NOTE — TELEPHONE ENCOUNTER
Refill passed per [facility] protocol.  Requested Prescriptions   Pending Prescriptions Disp Refills    escitalopram 10 MG Oral Tab 90 tablet 0     Sig: Take 1 tablet (10 mg total) by mouth daily.       Psychiatric Non-Scheduled (Anti-Anxiety) Passed - 9/27/2024  3:46 PM        Passed - In person appointment or virtual visit in the past 6 mos or appointment in next 3 mos     Recent Outpatient Visits              2 weeks ago Anxiety and depression    [facility] [place] Ally Manriquez APRN    Office Visit    6 months ago [facility]-re[facility]    8 months ago [facility]ergy, [facility] encounter    [facility]    Telemedicine    10 months ago Wellness examination    [facility]    Nurse Only    2 years ago Seborrheic keratoses    [facility]    [facility]                      Passed - Depression Screening completed within the past 12 months           Recent Outpatient Visits              2 weeks ago Anxiety and depression    [facility]    Office Visit    6 months ago Acute non-recurrent maxillary sinusitis    [facility]    Tele[facility]icine    8 months ago [facility], initial encounter    [facility]    10 months ago Wellness examination    [facility]    Nurse Only    2 years ago Seborrheic keratoses    [facility]    Office Visit

## 2024-09-30 ENCOUNTER — PATIENT MESSAGE (OUTPATIENT)
Dept: INTERNAL MEDICINE CLINIC | Facility: CLINIC | Age: 39
End: 2024-09-30

## 2024-10-01 RX ORDER — ESCITALOPRAM OXALATE 10 MG/1
10 TABLET ORAL DAILY
Qty: 90 TABLET | Refills: 0 | OUTPATIENT
Start: 2024-10-01

## 2024-10-01 NOTE — TELEPHONE ENCOUNTER
escitalopram 10 MG Oral Tab 90 tablet 0 9/27/2024 --    Sig - Route: Take 1 tablet (10 mg total) by mouth daily. - Oral    Sent to pharmacy as: Escitalopram Oxalate 10 MG Oral Tablet (Lexapro)    E-Prescribing Status: Receipt confirmed by pharmacy (9/27/2024  3:47 PM CDT)      Pharmacy    [id number] - [place], [place] - [address] AT [facility], [phone], [phone]

## 2024-10-02 NOTE — TELEPHONE ENCOUNTER
From: Nancy Bhat  To: Jeanie Cota  Sent: 9/30/2024 11:55 PM CDT  Subject: Lexapro again    The last time we spoke, you assured me you were filling my lexapro for a year of refills. But I’ve been without it for two days now because yet again there were no refills. The pharmacy said they had already reached out but days went by and nothing. I sent a request through here too and another practitioner gave me a script but again no refills. I shouldn’t be without a medicine that causes withdrawals after I’ve had all the conversations I need to with my prescriber to avoid this and I don’t understand why it’s a recurrent issue.

## 2024-10-03 RX ORDER — ESCITALOPRAM OXALATE 10 MG/1
10 TABLET ORAL DAILY
Qty: 90 TABLET | Refills: 3
Start: 2024-10-03

## 2024-11-14 NOTE — TELEPHONE ENCOUNTER
90 day supply requested           buPROPion  MG Oral Tablet 24 Hr, Take 1 tablet (150 mg total) by mouth daily., Disp: 30 tablet, Rfl: 0

## 2024-11-15 NOTE — TELEPHONE ENCOUNTER
Refill passed per [facility] protocol, however, per last visit:    2. Weight gain due to medication  -Discussed use of weight loss medications.  Patient declines.  Would like to try adjunct medication with escitalopram to see if it helps control appetite  - Will start patient on bupropion  mg 1 tablet daily  - Follow-up in 2 weeks  - Encourage patient to continue regular exercise and watch calorie intake closely    [employer] message sent to patient to schedule follow up.    No future appointments.      Requested Prescriptions   Pending Prescriptions Disp Refills    buPROPion  MG Oral Tablet 24 Hr 30 tablet 0     Sig: Take 1 tablet (150 mg total) by mouth daily.       Psychiatric Non-Scheduled (Anti-Anxiety) Passed - 11/15/2024  3:24 PM        Passed - In person appointment or virtual visit in the past 6 mos or appointment in next 3 mos     Recent Outpatient Visits              [facility]    2 months ago [facility] and depression    [facility]    Office Visit    8 months ago Acute non-recurrent maxillary sinusitis    [facility]    [facility]    10 months ago Allergy, initial encounter    [facility]    [facility]    11 months ago Wellness examination    [facility]    Nurse Only                      Passed - Depression Screening completed within the past 12 months           Recent Outpatient Visits              3 weeks ago Anxiety and depression    [facility] [address] [place] [place] [facility]    Telemedicine    2 months ago Anxiety and depression    [facility] [address] [place] [place] [facility]     Office Visit    8 months ago Acute non-recurrent maxillary sinusitis    [facility] [place]    Telemedicine    10 months ago Allergy, initial encounter    [facility] Jeanie Garnica APRN    Telemedicine    11 months ago Wellness examination    [facility]    Nurse Only

## 2024-11-16 RX ORDER — BUPROPION HYDROCHLORIDE 150 MG/1
150 TABLET ORAL DAILY
Qty: 30 TABLET | Refills: 0 | OUTPATIENT
Start: 2024-11-16

## 2024-11-22 RX ORDER — BUPROPION HYDROCHLORIDE 150 MG/1
150 TABLET ORAL DAILY
Qty: 30 TABLET | Refills: 0 | Status: SHIPPED | OUTPATIENT
Start: 2024-11-22

## 2024-11-22 RX ORDER — BUPROPION HYDROCHLORIDE 150 MG/1
150 TABLET ORAL DAILY
Qty: 30 TABLET | Refills: 0 | Status: SHIPPED | OUTPATIENT
Start: 2024-11-22 | End: 2024-11-22

## 2024-11-22 RX ORDER — BUPROPION HYDROCHLORIDE 150 MG/1
150 TABLET ORAL DAILY
Qty: 30 TABLET | Refills: 0 | OUTPATIENT
Start: 2024-11-22

## 2024-11-22 NOTE — TELEPHONE ENCOUNTER
REFILL PASSED PER [facility] PROTOCOLS    Requested Prescriptions   Pending Prescriptions Disp Refills    buPROPion  MG Oral Tablet 24 Hr 30 tablet 0     Sig: Take 1 tablet (150 mg total) by mouth daily.       Psychiatric Non-Scheduled (Anti-Anxiety) Passed - 11/22/2024  4:33 PM        Passed - In person appointment or virtual visit in the past 6 mos or appointment in next 3 mos     Recent Outpatient Visits              1 month ago Anxiety and depression    [facility] Ally Manriquez APRN    Telemedicine    2 months ago Anxiety and depression    [facility] Ally Manriquez APRN    Office Visit    8 months ago Acute non-recurrent maxillary sinusitis    [facility] Jeanie Cota APRN    Telemedicine    10 months ago Allergy, initial encounter    [facility] Jeanie Cota APRN    Telemedicine    12 months ago Wellness examination    [facility]    Nurse Only          Future Appointments         Provider Department Appt Notes    In 1 week Ally Manriquez APRN [facility] medication follow up    In 4 weeks  [facility]                     Passed - Depression Screening completed within the past 12 months         Refused Prescriptions Disp Refills    BUPROPION  MG Oral Tablet 24 Hr [Pharmacy Med Name: BUPROPION HCL  MG TABLET] 30 tablet 0     Sig: TAKE 1 TABLET BY MOUTH EVERY DAY       Psychiatric Non-Scheduled (Anti-Anxiety) Passed - 11/22/2024  4:33 PM        Passed - In person appointment or virtual visit in the past 6 mos or appointment in next 3 mos     Recent Outpatient Visits              1 month ago Anxiety and depression    [facility] Ally Manriquez APRN     Telemedicine    2 months ago Anxiety and depression    [facility]    Office Visit    8 months ago Acute non-recurrent maxillary sinusitis    [facility] Jeanie Garnica APRN    Telemedicine    10 months ago Allergy, initial encounter    [facility], Jeanie Garnica, TARAN    Telemedicine    12 months ago Wellness examination    [facility]    Nurse Only          Future Appointments         Provider Department Appt Notes    In 1 week [facility]    In 4 weeks  MR RM3 (3T WIDE) [facility] MRI                     Passed - Depression Screening completed within the past 12 months             Future Appointments         Provider Department Appt Notes    In 1 week [facility]    In 4 weeks  MR RM3 (3T WIDE) [facility] MRI           Recent Outpatient Visits              [facility] month [facility]    2 months ago [facility]    [facility] Visit    8 months ago Acute non-recurrent maxillary sinusitis    [facility] Jeanie Garnica APRN    Telemedicine    10 months ago Allergy, initial encounter    [facility]    [facility]    12 months ago Wellness examination    [facility]    Nurse Only

## 2024-11-22 NOTE — TELEPHONE ENCOUNTER
Please call patient to make a follow up appointment and [employer] message sent in previous encounter,thank you.

## 2024-11-22 NOTE — TELEPHONE ENCOUNTER
1st attempt/left voice mail message for patient to call back. Please, see message below when the call is returned.

## 2024-11-22 NOTE — TELEPHONE ENCOUNTER
Verified name and .    Patient scheduled follow up for first available with TARAN Canales:    Future Appointments   Date Time Provider Department Center   2024 10:20 AM Ally Manriquez APRN [facility]   2024  7:00 AM  MR RM3 (3T WIDE)  [facility]     She is requesting prescription for at least a temporary refill to least her until upcoming appointment. She states that she only has 2 pills left. She states that medication has been working well and she has no side effects.    Please see pended medication for your completion and approval.

## 2024-12-02 ENCOUNTER — LAB ENCOUNTER (OUTPATIENT)
Dept: LAB | Age: 39
End: 2024-12-02
Attending: PHYSICIAN ASSISTANT
Payer: COMMERCIAL

## 2024-12-02 DIAGNOSIS — R10.2 PELVIC PAIN SYNDROME: Primary | ICD-10-CM

## 2024-12-02 LAB
CANCER AG125 SERPL-ACNC: 12 U/ML (ref ?–30.2)
ESTRADIOL SERPL-MCNC: 110.4 PG/ML
FSH SERPL-ACNC: 3.8 MIU/ML
LH SERPL-ACNC: 12.7 MIU/ML
T3FREE SERPL-MCNC: 3.06 PG/ML (ref 2.4–4.2)
T4 FREE SERPL-MCNC: 1 NG/DL (ref 0.8–1.7)
TSI SER-ACNC: 0.7 UIU/ML (ref 0.55–4.78)

## 2024-12-02 PROCEDURE — 36415 COLL VENOUS BLD VENIPUNCTURE: CPT

## 2024-12-02 PROCEDURE — 84439 ASSAY OF FREE THYROXINE: CPT

## 2024-12-02 PROCEDURE — 83002 ASSAY OF GONADOTROPIN (LH): CPT

## 2024-12-02 PROCEDURE — 84443 ASSAY THYROID STIM HORMONE: CPT

## 2024-12-02 PROCEDURE — 86304 IMMUNOASSAY TUMOR CA 125: CPT

## 2024-12-02 PROCEDURE — 82670 ASSAY OF TOTAL ESTRADIOL: CPT

## 2024-12-02 PROCEDURE — 83001 ASSAY OF GONADOTROPIN (FSH): CPT

## 2024-12-02 PROCEDURE — 84481 FREE ASSAY (FT-3): CPT

## 2024-12-04 RX ORDER — ESCITALOPRAM OXALATE 10 MG/1
10 TABLET ORAL DAILY
Qty: 90 TABLET | Refills: 0 | OUTPATIENT
Start: 2024-12-04

## 2024-12-20 ENCOUNTER — HOSPITAL ENCOUNTER (OUTPATIENT)
Dept: MRI IMAGING | Facility: HOSPITAL | Age: 39
Discharge: HOME | End: 2024-12-20
Attending: PHYSICIAN ASSISTANT
Payer: COMMERCIAL

## 2024-12-20 DIAGNOSIS — N94.89 ADNEXAL MASS: ICD-10-CM

## 2024-12-20 DIAGNOSIS — R10.2 PELVIC PAIN: ICD-10-CM

## 2024-12-20 PROCEDURE — 72197 MRI PELVIS W/O & W/DYE: CPT | Performed by: PHYSICIAN ASSISTANT

## 2024-12-20 PROCEDURE — A9575 INJ GADOTERATE MEGLUMI 0.1ML: HCPCS | Performed by: PHYSICIAN ASSISTANT

## 2024-12-20 RX ORDER — GADOTERATE MEGLUMINE 376.9 MG/ML
20 INJECTION INTRAVENOUS
Status: COMPLETED | OUTPATIENT
Start: 2024-12-20 | End: 2024-12-20

## 2024-12-20 RX ADMIN — GADOTERATE MEGLUMINE 18 ML: 376.9 INJECTION INTRAVENOUS at 08:18:00

## 2024-12-20 NOTE — TELEPHONE ENCOUNTER
Requested Prescriptions     Pending Prescriptions Disp Refills    buPROPion  MG Oral Tablet 24 Hr 30 tablet 0     Sig: Take 1 tablet (150 mg total) by mouth daily.     Routing through protocols

## 2024-12-20 NOTE — TELEPHONE ENCOUNTER
Pharmacy: 90 day request        buPROPion  MG Oral Tablet 24 Hr, Take 1 tablet (150 mg total) by mouth daily., Disp: 30 tablet, Rfl: 0

## 2024-12-25 NOTE — TELEPHONE ENCOUNTER
Refill passed per [facility] protocol.  Last office visit = 12/2/24   Last refill = 12/2/24  for # 30     Medication pended for your review / approval  SSESSMENT/PLAN:   1. Anxiety and depression  -Continue escitalopram 10 mg daily  - Continue bupropion  mg tablet daily.  Discussed increasing bupropion ER to 150 mg twice daily.  Patient however would like to wait until MRI pelvis completed  - Discussed possibility of switching escitalopram to duloxetine if MRI pelvis is normal.  Patient will send [employer] message with update once imaging completed  - Patient given information about GeneSight testing. Will consider testing in the future      Requested Prescriptions   Pending Prescriptions Disp Refills    buPROPion  MG Oral Tablet 24 Hr 30 tablet 0     Sig: Take 1 tablet (150 mg total) by mouth daily.       Psychiatric Non-Scheduled (Anti-Anxiety) Passed - 12/25/2024  9:49 AM        Passed - In person appointment or virtual visit in the past 6 mos or appointment in next 3 mos     Recent Outpatient Visits              3 weeks ago Anxiety and depression    [facility]    Office Visit    2 months ago Anxiety and depression    [facility]    Telemedicine    3 months ago Anxiety and depression    [facility]    Office Visit    9 months ago Acute non-recurrent maxillary sinusitis    [facility]    [facility]    11 months ago Allergy, initial encounter    [facility]    [facility]                      Passed - Depression Screening completed within the past 12 months             Recent Outpatient Visits              3 weeks ago Anxiety and depression    [facility]    Office Visit    2 months ago [facility]    3 months ago [facility] and depression    [facility]    Office Visit    9 months ago Acute non-recurrent maxillary sinusitis    [facility]    [facility]    11 months ago Allergy, initial encounter    [facility]    [facility]

## 2024-12-26 RX ORDER — BUPROPION HYDROCHLORIDE 150 MG/1
150 TABLET ORAL DAILY
Qty: 90 TABLET | Refills: 0 | Status: SHIPPED | OUTPATIENT
Start: 2024-12-26

## 2024-12-27 NOTE — TELEPHONE ENCOUNTER
Please review; protocol failed/ has no protocol    Message sent for patient to have recheck Vitamin D lab done.    Requested Prescriptions   Pending Prescriptions Disp Refills    ERGOCALCIFEROL 1.25 MG (12163 UT) Oral Cap [Pharmacy Med Name: VITAMIN D2 1.25MG(50,000 UNIT)] 12 capsule 0     Sig: TAKE 1 CAPSULE BY MOUTH ONE TIME PER WEEK       There is no refill protocol information for this order        Recent Outpatient Visits              3 weeks ago Anxiety and depression    [facility]    Office Visit    2 months ago Anxiety and depression    [facility]    Telemedicine    3 months ago Anxiety and depression    [facility], APR[facility]    Office Visit    9 months ago Acute non-recurrent maxillary sinusitis    [facility]    [facility]    11 months ago Allergy, initial encounter    [facility]    [facility]          Future Appointments         Provider Department Appt Notes    In 3 weeks [facility] DEXA RM1; [facility] JEANETTE RM1 [facility] [place]

## 2024-12-30 RX ORDER — ERGOCALCIFEROL 1.25 MG/1
50000 CAPSULE, LIQUID FILLED ORAL WEEKLY
Refills: 0 | OUTPATIENT
Start: 2024-12-30

## 2025-01-06 DIAGNOSIS — F32.A ANXIETY AND DEPRESSION: ICD-10-CM

## 2025-01-06 DIAGNOSIS — F41.9 ANXIETY AND DEPRESSION: ICD-10-CM

## 2025-01-09 NOTE — TELEPHONE ENCOUNTER
Please review. Protocol Failed; No Protocol      Recent fills: 9/11/2024  Last Rx written: 9/11/2024  Last office visit: 12/2/2024          Requested Prescriptions   Pending Prescriptions Disp Refills    ALPRAZOLAM 0.25 MG Oral Tablet Dispersible [Pharmacy Med Name: ALPRAZOLAM ODT 0.25 MG TAB] 15 tablet 0     Sig: TAKE 1 TABLET BY MOUTH 3 TIMES DAILY AS NEEDED FOR ANXIETY.       Controlled Substance Medication Failed - 1/9/2025  3:45 PM        Failed - This medication is a controlled substance - forward to provider to refill        Passed - Medication is active on med list               Future Appointments         Provider Department Appt Notes    In 1 week [facility] DEXA RM1; [facility] JEANETTE RM1 [facility] [place]           Recent Outpatient Visits              1 month ago Anxiety and depression    [facility]    Office Visit    2 months ago Anxiety and depression    [facility]    Telemedicine    4 months ago Anxiety and depression    [facility]    Office Visit    10 months ago Acute non-recurrent maxillary sinusitis    [facility]    [facility]    1 year ago Allergy, initial encounter    [facility]    [facility]icine

## 2025-01-10 RX ORDER — ALPRAZOLAM 0.25 MG/1
0.25 TABLET, ORALLY DISINTEGRATING ORAL 3 TIMES DAILY PRN
Qty: 15 TABLET | Refills: 0 | Status: SHIPPED | OUTPATIENT
Start: 2025-01-10

## 2025-01-21 ENCOUNTER — HOSPITAL ENCOUNTER (OUTPATIENT)
Dept: MAMMOGRAPHY | Age: 40
Discharge: HOME | End: 2025-01-21
Attending: PHYSICIAN ASSISTANT
Payer: COMMERCIAL

## 2025-01-21 DIAGNOSIS — Z12.31 ENCOUNTER FOR SCREENING MAMMOGRAM FOR MALIGNANT NEOPLASM OF BREAST: ICD-10-CM

## 2025-01-21 PROCEDURE — 77063 BREAST TOMOSYNTHESIS BI: CPT | Performed by: PHYSICIAN ASSISTANT

## 2025-01-21 PROCEDURE — 77067 SCR MAMMO BI INCL CAD: CPT | Performed by: PHYSICIAN ASSISTANT

## 2025-02-06 NOTE — TELEPHONE ENCOUNTER
Refill passed per [facility] protocol.    Did you wan to refill ??  Last office visit 12/2/2024      ASSESSMENT/PLAN:   1. Anxiety and depression  -Continue escitalopram 10 mg daily  - Continue bupropion  mg tablet daily.  Discussed increasing bupropion ER to 150 mg twice daily.  Patient however would like to wait until MRI pelvis completed  - Discussed possibility of switching escitalopram to duloxetine if MRI pelvis is normal.  Patient will send [employer] message with update once imaging completed  - Patient given information about GeneSight testing. Will consider testing in the future    Requested Prescriptions   Pending Prescriptions Disp Refills    ESCITALOPRAM 10 MG Oral Tab [Pharmacy Med Name: ESCITALOPRAM 10 MG TABLET] 30 tablet 0     Sig: TAKE 1 TABLET BY MOUTH EVERY DAY       Psychiatric Non-Scheduled (Anti-Anxiety) Passed - 2/6/2025  8:17 AM        Passed - In person appointment or virtual visit in the past 6 mos or appointment in next 3 mos     Recent Outpatient Visits              2 months ago Anxiety and depression    [facility]    Office Visit    3 months ago Anxiety and depression    [facility] APR[facility]    Telemedicine    4 months ago Anxiety and depression    [facility], APR[facility]    Office Visit    11 months ago Acute non-recurrent maxillary sinusitis    [facility]    [facility]    1 year ago Allergy, initial encounter    [facility]    [facility]                      Passed - Depression Screening completed within the past 12 months        Passed - Medication is active on med list             Recent Outpatient Visits              2 months ago Anxiety and  depression    [facility], [facility]N    Office Visit    3 months ago Anxiety and depression    [facility], APRN    Telemedicine    4 months ago Anxiety and depression    [facility], APRN    Office Visit    11 months ago Acute non-recurrent maxillary sinusitis    [facility] Jeanie Garnica APRN    Telemedicine    1 year ago Allergy[facility] initial encounter    [facility]

## 2025-02-07 RX ORDER — ESCITALOPRAM OXALATE 10 MG/1
10 TABLET ORAL DAILY
Qty: 90 TABLET | Refills: 0 | Status: SHIPPED | OUTPATIENT
Start: 2025-02-07 | End: 2025-05-06

## 2025-04-23 RX ORDER — BUPROPION HYDROCHLORIDE 150 MG/1
150 TABLET ORAL DAILY
Qty: 90 TABLET | Refills: 3 | Status: SHIPPED | OUTPATIENT
Start: 2025-04-23

## 2025-04-23 NOTE — TELEPHONE ENCOUNTER
Refill Per Protocol     Requested Prescriptions   Pending Prescriptions Disp Refills    BUPROPION  MG Oral Tablet 24 Hr [Pharmacy Med Name: BUPROPION HCL  MG TABLET] 90 tablet 0     Sig: TAKE 1 TABLET BY MOUTH EVERY DAY       Psychiatric Non-Scheduled (Anti-Anxiety) Passed - 4/23/2025  4:07 PM        Passed - In person appointment or virtual visit in the past 6 mos or appointment in next 3 mos     Recent Outpatient Visits              4 months ago Anxiety and depression    [facility]    Office Visit    6 months ago Anxiety and depression    [facility]    Telemedicine    7 months ago Anxiety and depression    [facility] [facility]    Office Visit    1 year ago Acute non-recurrent maxillary sinusitis    [facility]    1 year ago Allergy, initial encounter    [facility]                      Passed - Depression Screening completed within the past 12 months        Passed - Medication is active on med list

## 2025-05-06 RX ORDER — ESCITALOPRAM OXALATE 10 MG/1
10 TABLET ORAL DAILY
Qty: 90 TABLET | Refills: 3 | Status: SHIPPED | OUTPATIENT
Start: 2025-05-06

## 2025-05-06 NOTE — TELEPHONE ENCOUNTER
Refill Per Protocol     Requested Prescriptions   Pending Prescriptions Disp Refills    ESCITALOPRAM 10 MG Oral Tab [Pharmacy Med Name: ESCITALOPRAM 10 MG TABLET] 90 tablet 0     Sig: TAKE 1 TABLET BY MOUTH EVERY DAY       Psychiatric Non-Scheduled (Anti-Anxiety) Passed - 5/6/2025 10:32 AM        Passed - In person appointment or virtual visit in the past 6 mos or appointment in next 3 mos     Recent Outpatient Visits              5 months ago Anxiety and depression    [facility] [address] [facility]    Office Visit    6 months ago Anxiety and depression    [facility]    Telemedicine    7 months ago Anxiety and depression    [facility] [address] [facility]    Office Visit    1 year ago Acute non-recurrent maxillary sinusitis    [facility]    [facility]    1 year ago Allergy, initial encounter    [facility]    [facility]                      Passed - Depression Screening completed within the past 12 months        Passed - Medication is active on med list

## (undated) DEVICE — BLADE SURG 11 STRL SS

## (undated) DEVICE — SUTURE PROLENE 0 CT1 30IN MONO NABSB BLUE 8424H

## (undated) DEVICE — NEEDLE INSFL 120MM EPTH PNMPRTN

## (undated) DEVICE — TRAY CATH SURESTEP LUBRI-SIL STLK 16FR 350ML FOLEY URMTR

## (undated) DEVICE — COVER FLXB SEMIRIGID STRL LF DEVON LITEGLOVE LIGHT HNDL

## (undated) DEVICE — SYRINGE 10ML GRAD N-PYRG DEHP-FR PVC FREE STRL MED LF DISP

## (undated) DEVICE — MANIPULATOR UT 34MM VCARE + MED CUP STRL LF DISP

## (undated) DEVICE — ELECTRODE PT RTN C30- LB 9FT CORD NONIRRITATE NONSENSITIZE

## (undated) DEVICE — SYRINGE 30ML CONC TIP GRAD N-PYRG DEHP-FR STRL MED LF DISP

## (undated) DEVICE — TRAY SKIN SCRUB 8IN VINYL CTN 6 WING 6 SPNG STK 2 TIP APL

## (undated) DEVICE — GLOVE SURG 7.5 PROTEXIS LF BLUE PF SMTH BEAD CUFF INTLK STRL

## (undated) DEVICE — GLOVE SURG 7.5 DRMPRN ULTRA SRFT TECHNOLOGY LF DARK GRN PF

## (undated) DEVICE — GLOVE SURG 7.5 PROTEXIS LF CRM PF SMTH BEAD CUFF STRL

## (undated) DEVICE — ADHESIVE PREMIERPRO EXOFIN 1ML HVISC TUBE SOFT FLXB APL

## (undated) DEVICE — SET TBG 3 LUM FLTR AIRSEAL STRL LF ACT CHRCL DISP

## (undated) DEVICE — NEEDLE HPO 22GA 1.5IN BVL ORT SELF LVL SHTH SFSHLD STRL LF

## (undated) DEVICE — DRAPE ARM 21X19X10.5IN EQUIPMENT DA VINCI XI 21LB

## (undated) DEVICE — HEMOSTAT ABS 4X4IN NONWOVEN SURGICEL SNOW

## (undated) DEVICE — SEAL CANNULA 5-12MM DISP

## (undated) DEVICE — SUTURE MONOCRYL MTPS 4-0 PS2 18IN MONO ABS UNDYED

## (undated) DEVICE — SYRINGE 50ML ECNTRC TIP STRL MED LF DISP BD

## (undated) DEVICE — Device

## (undated) DEVICE — SYRINGE 10ML CNTRL CONC TIP PYROGEN FREE DEHP-FR STRL MED LF

## (undated) DEVICE — SYSTEM IMG CLEARIFY 8X6IN WARM HUB TROCAR WIPE MRFBR DISP

## (undated) DEVICE — PORT HAND ACC 120MM 2 WALL CANNULA BLDLS OPTC TIP LOWPRFL

## (undated) DEVICE — SUTURE PDS2 4-0 SH 27IN MONO ABS VIOL

## (undated) DEVICE — GOWN SURG XL L4 IMPRV REINFORCE SET IN SLV STRL LF DISP BLUE

## (undated) DEVICE — TRAY CATH SURESTEP LBRCTH STLK 16FR 1 LAYER FOLEY URMTR STAB

## (undated) DEVICE — 2% CHLORHEXIDINE SKIN PREP ORANGE 26ML

## (undated) DEVICE — GOWN SURG XL L3 NONREINFORCE SET IN SLV STRL LF DISP BLUE

## (undated) DEVICE — DRAPE PLVC FLUID POUCH ROBOTIC LAPSCP LITH 142X124X30IN SURG

## (undated) DEVICE — GLOVE SURG 7 PROTEXIS LTX YLW PF SMTH BEAD CUFF STRL

## (undated) DEVICE — APPLICATOR 40CM ENDO 360D SPRAY SET SNPLK GAS ASSIST TIP LF

## (undated) DEVICE — STAPLER SKIN 3.9X6.9MM WIDE RECT 35 CNT ROTATE HEAD RCHT